# Patient Record
Sex: MALE | Race: BLACK OR AFRICAN AMERICAN | NOT HISPANIC OR LATINO | URBAN - METROPOLITAN AREA
[De-identification: names, ages, dates, MRNs, and addresses within clinical notes are randomized per-mention and may not be internally consistent; named-entity substitution may affect disease eponyms.]

---

## 2020-05-03 ENCOUNTER — EMERGENCY (EMERGENCY)
Facility: HOSPITAL | Age: 45
LOS: 1 days | Discharge: HOME | End: 2020-05-03
Attending: STUDENT IN AN ORGANIZED HEALTH CARE EDUCATION/TRAINING PROGRAM | Admitting: STUDENT IN AN ORGANIZED HEALTH CARE EDUCATION/TRAINING PROGRAM
Payer: COMMERCIAL

## 2020-05-03 VITALS
RESPIRATION RATE: 18 BRPM | OXYGEN SATURATION: 98 % | DIASTOLIC BLOOD PRESSURE: 61 MMHG | HEART RATE: 85 BPM | TEMPERATURE: 101 F | SYSTOLIC BLOOD PRESSURE: 106 MMHG

## 2020-05-03 VITALS
OXYGEN SATURATION: 100 % | DIASTOLIC BLOOD PRESSURE: 76 MMHG | HEART RATE: 88 BPM | RESPIRATION RATE: 16 BRPM | SYSTOLIC BLOOD PRESSURE: 129 MMHG | TEMPERATURE: 101 F

## 2020-05-03 PROCEDURE — 93010 ELECTROCARDIOGRAM REPORT: CPT

## 2020-05-03 PROCEDURE — 99283 EMERGENCY DEPT VISIT LOW MDM: CPT | Mod: 25

## 2020-05-03 PROCEDURE — 71045 X-RAY EXAM CHEST 1 VIEW: CPT | Mod: 26

## 2020-05-03 RX ORDER — ACETAMINOPHEN 500 MG
650 TABLET ORAL ONCE
Refills: 0 | Status: COMPLETED | OUTPATIENT
Start: 2020-05-03 | End: 2020-05-03

## 2020-05-03 RX ADMIN — Medication 650 MILLIGRAM(S): at 22:48

## 2020-05-03 NOTE — ED PROVIDER NOTE - PHYSICAL EXAMINATION
PHYSICAL EXAM:  GENERAL: NAD, well-developed  HEAD:  Atraumatic, Normocephalic  EYES: EOMI, PERRLA, conjunctiva and sclera clear  NECK: Supple, No JVD  CHEST/LUNG: Clear to auscultation bilaterally; No wheeze  HEART: Regular rate and rhythm; No murmurs, rubs, or gallops  ABDOMEN: Soft, Nontender, Nondistended; Bowel sounds present  EXTREMITIES:  2+ Peripheral Pulses, No clubbing, cyanosis, or edema  PSYCH: AAOx3  NEUROLOGY: non-focal  SKIN: No rashes or lesions PHYSICAL EXAM:  GENERAL: NAD, well-developed  HEAD:  Atraumatic, Normocephalic  EYES: EOMI, PERRLA, conjunctiva and sclera clear  NECK: Supple, No JVD  CHEST/LUNG: Clear to auscultation bilaterally; No wheeze  HEART: Regular rate and rhythm; No murmurs, rubs, or gallops  ABDOMEN: Soft, Nontender, Nondistended; Bowel sounds present  EXTREMITIES:  2+ Peripheral Pulses, No clubbing, cyanosis, or edema  PSYCH: AAOx3  NEUROLOGY: non-focal  SKIN: No rashes or lesions  ambulatory O2 sat 98%

## 2020-05-03 NOTE — ED PROVIDER NOTE - NS ED ROS FT
REVIEW OF SYSTEMS:    CONSTITUTIONAL: +fatigue, fevers   EYES/ENT: No visual changes;  + throat pain   NECK: No pain or stiffness  RESPIRATORY: +cough, no wheezing, hemoptysis; + mild shortness of breath  CARDIOVASCULAR: +chest discomfort, no palpitations  GASTROINTESTINAL: No abdominal or epigastric pain. No nausea, vomiting, or hematemesis; +diarrhea. No melena or hematochezia.  GENITOURINARY: No dysuria, frequency or hematuria  NEUROLOGICAL: No numbness or weakness  SKIN: No itching, burning, rashes, or lesions   All other review of systems is negative unless indicated above.

## 2020-05-03 NOTE — ED ADULT TRIAGE NOTE - CHIEF COMPLAINT QUOTE
Pt alert c/o "fevers since Wednesday and cough starting today". Pt tested for covid yesterday pending results. Denies hx or medications. No distress noted.

## 2020-05-03 NOTE — ED PROVIDER NOTE - PATIENT PORTAL LINK FT
You can access the FollowMyHealth Patient Portal offered by [facility] by registering at the following website: [email or web address]. By joining [facility]’s FollowMyHealth portal, you will also be able to view your health information using other applications (apps) compatible with our system.

## 2020-05-03 NOTE — ED PROVIDER NOTE - CLINICAL SUMMARY MEDICAL DECISION MAKING FREE TEXT BOX
44y M with no PMH presenting with fever and cough and consistent with high suspicion for COVID 19 infection. Will obtain ECG and CXR and likely discharge with portable pulse ox and clear instructions for symptom monitoring. 44y M with no PMH presenting with fever and cough with high suspicion for COVID 19 infection. Will obtain ECG and CXR and likely discharge with portable pulse ox and clear instructions for symptom monitoring.

## 2020-05-03 NOTE — ED PROVIDER NOTE - NSFOLLOWUPINSTRUCTIONS_ED_ALL_ED_FT
If you feel worsening shortness of breath or chest pain, lightheadedness please come back to the ED. Please check your oxygen saturation at home and use tylenol every 6 hrs as needed for fevers greater than 100.4F. You were seen for viral symptoms. Your chest Xray was clear and EKG was normal. You were given tylenol for your fever. If you feel worsening shortness of breath or chest pain, lightheadedness please come back to the ED. Please check your oxygen saturation at home and use tylenol every 6 hrs as needed for fevers greater than 100.4F. Please self isolate until your symptoms resolve and follow up your results of your COVID test that you had done in urgent care.

## 2020-05-03 NOTE — ED PROVIDER NOTE - ATTENDING CONTRIBUTION TO CARE
Latosha Howe M.D: 44M no pmh, arrives w/ 5 days fevers/chills (tmax 103), malaise, fatigue, diarrhea (NB), and nonproductive cough. was tested for COVID eysterday, result unknown at this time. has been taking tylenol 1gram every 6 hours but still having persistent fever which is mainly what is worrying him. mom with similar symptoms but she got better. on exam pt is nontoxic appearing, lungs coarse b/l, heart rrr, abd soft ntnd, no LE edema, rest of exam per resident documentation. pt satting 98% on RA and 98% on ambulation. at this point pt with high suspicion fro COVID, but without hypoxia and pt nontoxic appearing no indication for workup at this time. lengthy discussion with pt that fever while uncomfortable is not life threatening and instructed further treamtnet with tylenol and icepacks. strict return precautions discussed and pt to be given pulse ox for dc to monitor oxygen saturation at home. understands need to return if anything worsens or changes or becomes hypoxic <92% at rest, <88% on ambulation. will obtain cxr and ekg here to screen for signs of cardiac iscemia or significant pna.

## 2020-05-03 NOTE — ED ADULT NURSE NOTE - OBJECTIVE STATEMENT
Pt. received to room 10. A&Ox3. ambulatory. C/O of Fevers for the past 5 days unrelieved by Tylenol, Temp 101.2 orally. Pt. also endorses sharp non radiating pain in chest when coughing. Pt. respirations even and unlabored, spo2 100% on RA. ambulatory SPo2 98%. Pt. Placed on cardiac monitor SR, EKG in progress. Pt. +sick contacts at home but unknown if covid positive. Pt. has pending covid test from urgent care. Pt. denies nausea and vomiting. reports diarrhea since Friday. abdomen soft and nontender to palpation. NO Pmh. VSS. MD evaluation in progress. will continue to monitor.

## 2020-05-06 ENCOUNTER — EMERGENCY (EMERGENCY)
Facility: HOSPITAL | Age: 45
LOS: 1 days | Discharge: HOME | End: 2020-05-06
Attending: STUDENT IN AN ORGANIZED HEALTH CARE EDUCATION/TRAINING PROGRAM | Admitting: STUDENT IN AN ORGANIZED HEALTH CARE EDUCATION/TRAINING PROGRAM
Payer: COMMERCIAL

## 2020-05-06 VITALS
TEMPERATURE: 98 F | SYSTOLIC BLOOD PRESSURE: 103 MMHG | OXYGEN SATURATION: 98 % | RESPIRATION RATE: 25 BRPM | HEART RATE: 72 BPM | DIASTOLIC BLOOD PRESSURE: 62 MMHG

## 2020-05-06 VITALS
SYSTOLIC BLOOD PRESSURE: 104 MMHG | TEMPERATURE: 102 F | OXYGEN SATURATION: 97 % | HEART RATE: 93 BPM | RESPIRATION RATE: 22 BRPM | DIASTOLIC BLOOD PRESSURE: 59 MMHG

## 2020-05-06 LAB
ALBUMIN SERPL ELPH-MCNC: 3.9 G/DL — SIGNIFICANT CHANGE UP (ref 3.3–5)
ALP SERPL-CCNC: 52 U/L — SIGNIFICANT CHANGE UP (ref 40–120)
ALT FLD-CCNC: 41 U/L — SIGNIFICANT CHANGE UP (ref 4–41)
ANION GAP SERPL CALC-SCNC: 16 MMO/L — HIGH (ref 7–14)
AST SERPL-CCNC: 69 U/L — HIGH (ref 4–40)
BASE EXCESS BLDV CALC-SCNC: -0.1 MMOL/L — SIGNIFICANT CHANGE UP
BASOPHILS # BLD AUTO: 0.01 K/UL — SIGNIFICANT CHANGE UP (ref 0–0.2)
BASOPHILS NFR BLD AUTO: 0.2 % — SIGNIFICANT CHANGE UP (ref 0–2)
BILIRUB SERPL-MCNC: 0.3 MG/DL — SIGNIFICANT CHANGE UP (ref 0.2–1.2)
BLOOD GAS VENOUS - CREATININE: 1.36 MG/DL — HIGH (ref 0.5–1.3)
BUN SERPL-MCNC: 12 MG/DL — SIGNIFICANT CHANGE UP (ref 7–23)
CALCIUM SERPL-MCNC: 8.8 MG/DL — SIGNIFICANT CHANGE UP (ref 8.4–10.5)
CHLORIDE BLDV-SCNC: 103 MMOL/L — SIGNIFICANT CHANGE UP (ref 96–108)
CHLORIDE SERPL-SCNC: 95 MMOL/L — LOW (ref 98–107)
CO2 SERPL-SCNC: 23 MMOL/L — SIGNIFICANT CHANGE UP (ref 22–31)
CREAT SERPL-MCNC: 1.38 MG/DL — HIGH (ref 0.5–1.3)
D DIMER BLD IA.RAPID-MCNC: 332 NG/ML — SIGNIFICANT CHANGE UP
EOSINOPHIL # BLD AUTO: 0 K/UL — SIGNIFICANT CHANGE UP (ref 0–0.5)
EOSINOPHIL NFR BLD AUTO: 0 % — SIGNIFICANT CHANGE UP (ref 0–6)
GAS PNL BLDV: 132 MMOL/L — LOW (ref 136–146)
GLUCOSE BLDV-MCNC: 111 MG/DL — HIGH (ref 70–99)
GLUCOSE SERPL-MCNC: 113 MG/DL — HIGH (ref 70–99)
HCO3 BLDV-SCNC: 22 MMOL/L — SIGNIFICANT CHANGE UP (ref 20–27)
HCT VFR BLD CALC: 40 % — SIGNIFICANT CHANGE UP (ref 39–50)
HCT VFR BLDV CALC: 43.1 % — SIGNIFICANT CHANGE UP (ref 39–51)
HGB BLD-MCNC: 13.4 G/DL — SIGNIFICANT CHANGE UP (ref 13–17)
HGB BLDV-MCNC: 14 G/DL — SIGNIFICANT CHANGE UP (ref 13–17)
IMM GRANULOCYTES NFR BLD AUTO: 0.2 % — SIGNIFICANT CHANGE UP (ref 0–1.5)
LACTATE BLDV-MCNC: 1.6 MMOL/L — SIGNIFICANT CHANGE UP (ref 0.5–2)
LYMPHOCYTES # BLD AUTO: 0.74 K/UL — LOW (ref 1–3.3)
LYMPHOCYTES # BLD AUTO: 15.4 % — SIGNIFICANT CHANGE UP (ref 13–44)
MAGNESIUM SERPL-MCNC: 2 MG/DL — SIGNIFICANT CHANGE UP (ref 1.6–2.6)
MCHC RBC-ENTMCNC: 28.9 PG — SIGNIFICANT CHANGE UP (ref 27–34)
MCHC RBC-ENTMCNC: 33.5 % — SIGNIFICANT CHANGE UP (ref 32–36)
MCV RBC AUTO: 86.4 FL — SIGNIFICANT CHANGE UP (ref 80–100)
MONOCYTES # BLD AUTO: 0.27 K/UL — SIGNIFICANT CHANGE UP (ref 0–0.9)
MONOCYTES NFR BLD AUTO: 5.6 % — SIGNIFICANT CHANGE UP (ref 2–14)
NEUTROPHILS # BLD AUTO: 3.77 K/UL — SIGNIFICANT CHANGE UP (ref 1.8–7.4)
NEUTROPHILS NFR BLD AUTO: 78.6 % — HIGH (ref 43–77)
NRBC # FLD: 0 K/UL — SIGNIFICANT CHANGE UP (ref 0–0)
PCO2 BLDV: 47 MMHG — SIGNIFICANT CHANGE UP (ref 41–51)
PH BLDV: 7.35 PH — SIGNIFICANT CHANGE UP (ref 7.32–7.43)
PHOSPHATE SERPL-MCNC: 2.8 MG/DL — SIGNIFICANT CHANGE UP (ref 2.5–4.5)
PLATELET # BLD AUTO: 153 K/UL — SIGNIFICANT CHANGE UP (ref 150–400)
PMV BLD: 10.8 FL — SIGNIFICANT CHANGE UP (ref 7–13)
PO2 BLDV: < 24 MMHG — LOW (ref 35–40)
POTASSIUM BLDV-SCNC: 4 MMOL/L — SIGNIFICANT CHANGE UP (ref 3.4–4.5)
POTASSIUM SERPL-MCNC: 4.2 MMOL/L — SIGNIFICANT CHANGE UP (ref 3.5–5.3)
POTASSIUM SERPL-SCNC: 4.2 MMOL/L — SIGNIFICANT CHANGE UP (ref 3.5–5.3)
PROT SERPL-MCNC: 7.9 G/DL — SIGNIFICANT CHANGE UP (ref 6–8.3)
RBC # BLD: 4.63 M/UL — SIGNIFICANT CHANGE UP (ref 4.2–5.8)
RBC # FLD: 13.1 % — SIGNIFICANT CHANGE UP (ref 10.3–14.5)
SAO2 % BLDV: 14.5 % — LOW (ref 60–85)
SODIUM SERPL-SCNC: 134 MMOL/L — LOW (ref 135–145)
WBC # BLD: 4.8 K/UL — SIGNIFICANT CHANGE UP (ref 3.8–10.5)
WBC # FLD AUTO: 4.8 K/UL — SIGNIFICANT CHANGE UP (ref 3.8–10.5)

## 2020-05-06 PROCEDURE — 99285 EMERGENCY DEPT VISIT HI MDM: CPT

## 2020-05-06 PROCEDURE — 71045 X-RAY EXAM CHEST 1 VIEW: CPT | Mod: 26

## 2020-05-06 PROCEDURE — 71275 CT ANGIOGRAPHY CHEST: CPT | Mod: 26

## 2020-05-06 RX ORDER — IBUPROFEN 200 MG
600 TABLET ORAL ONCE
Refills: 0 | Status: COMPLETED | OUTPATIENT
Start: 2020-05-06 | End: 2020-05-06

## 2020-05-06 RX ORDER — ACETAMINOPHEN 500 MG
325 TABLET ORAL ONCE
Refills: 0 | Status: COMPLETED | OUTPATIENT
Start: 2020-05-06 | End: 2020-05-06

## 2020-05-06 RX ADMIN — Medication 325 MILLIGRAM(S): at 22:11

## 2020-05-06 RX ADMIN — Medication 600 MILLIGRAM(S): at 22:11

## 2020-05-06 NOTE — ED PROVIDER NOTE - NSFOLLOWUPCLINICS_GEN_ALL_ED_FT
[facility] Pulmonolgy and Sleep Medicine  Pulmonology  [address] [place] [address], [place] [address]  Phone: [phone]  Fax:   Follow Up Time: Routine

## 2020-05-06 NOTE — ED PROVIDER NOTE - PATIENT PORTAL LINK FT
You can access the FollowMyHealth Patient Portal offered by [facility] by registering at the following website: [email or web address]. By joining [facility]’s FollowMyHealth portal, you will also be able to view your health information using other applications (apps) compatible with our system.

## 2020-05-06 NOTE — ED ADULT TRIAGE NOTE - CHIEF COMPLAINT QUOTE
CVOID + male presents with increased SOB. 95% on RA. Placed on 2L NC for comfort with 02 saturation of 97%. PT reports fever of 104 at home and took tylenol at 7pm.

## 2020-05-06 NOTE — ED PROVIDER NOTE - OBJECTIVE STATEMENT
Hx of COVID diagnosis this past Sunday was discharged home with pulse oximeter, has been running 93-92% at home, been taking care of his mother who was COVID+ but states that he was feeling more short of breath today when a call back RN called him from [facility] and asked how he was, he stated his O2 sat and that he was more short of breath today and subsequently she called an ambulance to bring him here to the ER for further evaluation. States that he feels short of breath here but much better with supplemental oxygen.

## 2020-05-06 NOTE — ED PROVIDER NOTE - PHYSICAL EXAMINATION
Gen: NAD, non-toxic, conversational  Eyes: PERRLA, EOMI   HENT: Normocephalic, atraumatic. External ears normal, no rhinorrhea, moist mucous membranes.   CV: RRR, no M/R/G  Resp: CTAB, non-labored, speaking without difficulty on 2L o2nc  Abd: soft, non tender, non rigid, no guarding or rebound tenderness  Back: No CVAT bilaterally, no midline ttp  Skin: dry, wwp   Neuro: AOx3, speech is fluent and appropriate  Psych: Mood good, affect euthymic

## 2020-05-06 NOTE — ED PROVIDER NOTE - NSFOLLOWUPINSTRUCTIONS_ED_ALL_ED_FT
Shortness of breath    Shortness of breath (dyspnea) means you have trouble breathing and could indicate a medical problem. Causes include lung disease, heart disease, low amount of red blood cells (anemia), poor physical fitness, being overweight, smoking, etc. Your health care provider today may not be able to find a cause for your shortness of breath after your exam. In this case, it is important to have a follow-up exam with your primary care physician as instructed. If medicines were prescribed, take them as directed for the full length of time directed. Refrain from tobacco products.    SEEK IMMEDIATE MEDICAL CARE IF YOU HAVE ANY OF THE FOLLOWING SYMPTOMS: worsening shortness of breath, chest pain, back pain, abdominal pain, fever, coughing up blood, lightheadedness/dizziness.     You were seen and evaluated for possible COVID 19 infection. Testing was NOT performed. We think you are safe for discharge and would like you to follow up in a two to three days with your doctor by phone or in person.   You should treat fevers by taking Tylenol as needed.    You should stay hydrated and increase the amount of fluid you drink.  Return to the Emergency Department if your shortness of breath becomes worse, you become weak, or new symptoms develop.    You should monitor your temperature and quarantine yourself away from others - particularly the elderly, ill, or immunosuppressed - for the next 14 days.    We recommend the below precautionary steps from now until 14 days from when you returned from your travel or date of your last known possible contact:  - Do not go to work, school, or public areas. Avoid using public transportation, ride-sharing, or taxis.  -Wear a mask whenever you are around other people.  -Avoid sharing personal household items. You should not share dishes, drinking glasses, cups, eating utensils, towels, or bedding with other people or pets in your home. After using these items, they should be washed thoroughly with soap and water.   - Avoid touching your eyes, nose, and mouth with your hands.  - Wash your hands often with soap and water for at least 15 to 20 seconds or clean your hands with an alcohol-based hand  that contains 60 to 95% alcohol, covering all surfaces of your hands and rubbing them together until they feel dry. Soap and water should be used preferentially if hands are visibly dirty.

## 2020-05-06 NOTE — ED PROVIDER NOTE - ATTENDING CONTRIBUTION TO CARE
45yo M otherwise healthy, w COVID diagnosis this past Sunday was discharged home with pulse oximeter, has been running 93-92% at home, been taking care of his mother who was COVID+ but states that he was feeling more short of breath today with exertion when a call back RN called him from [facility] and asked how he was, he stated his O2 sat and that he was more short of breath today and subsequently she called an ambulance to bring him here to the ER for further evaluation. States that he feels short of breath here but much better with supplemental oxygen.  continues to have fevers  on exam pt in no acute distress but tachypneic,  100% on RA and 95% amb sat  will check labs, dimer, obs if remains normoxic will dc home

## 2020-05-06 NOTE — ED PROVIDER NOTE - CLINICAL SUMMARY MEDICAL DECISION MAKING FREE TEXT BOX
44M no pmhx presenting for evaluation of SOB, recent diagnosis of COVID 5 days ago now presenting for evaluation after having been called back by a call back RN today for low O2 sat at home in 92-93%, here doing well on supplemental O2, will check basics, cxr, ambulatory sat, follow up studies, reassess, dispo.

## 2020-05-06 NOTE — ED ADULT NURSE NOTE - OBJECTIVE STATEMENT
Pt is a 44yr old male, A&Ox4 and ambulatory, previously tested +COVID, complaining of worsening SOB at rest and on exertion with consistent dry cough. Pt reports using home pulse oximetry, "lowest reading was 93%". Pt currently 100% on RA, lowest ambulatory o2 sat at 95%, MD Payne made aware. Pt breathing even, however appears labored and tachypneic, denies feeling anxious. Orally febrile, pt reports taking 650mg of Tylenol at 7pm. Denies chest pain, headache, dizziness, abd. pain, N/V, and dysuria. VS as noted. NSR on the cardiac monitor. 20g IV placed in the L AC. Labs sent. Meds given as per order. Will continue to monitor.

## 2020-05-07 ENCOUNTER — INPATIENT (INPATIENT)
Facility: HOSPITAL | Age: 45
LOS: 4 days | Discharge: HOME HEALTH | End: 2020-05-12
Attending: INTERNAL MEDICINE | Admitting: INTERNAL MEDICINE
Payer: COMMERCIAL

## 2020-05-07 VITALS
SYSTOLIC BLOOD PRESSURE: 130 MMHG | HEART RATE: 117 BPM | TEMPERATURE: 100 F | DIASTOLIC BLOOD PRESSURE: 79 MMHG | OXYGEN SATURATION: 93 % | RESPIRATION RATE: 28 BRPM

## 2020-05-07 DIAGNOSIS — J18.9 PNEUMONIA, UNSPECIFIED ORGANISM: ICD-10-CM

## 2020-05-07 PROBLEM — Z00.00 ENCOUNTER FOR PREVENTIVE HEALTH EXAMINATION: Status: ACTIVE | Noted: 2020-05-07

## 2020-05-07 LAB
ALBUMIN SERPL ELPH-MCNC: 3.8 G/DL — SIGNIFICANT CHANGE UP (ref 3.3–5)
ALP SERPL-CCNC: 55 U/L — SIGNIFICANT CHANGE UP (ref 40–120)
ALT FLD-CCNC: 43 U/L — HIGH (ref 4–41)
ANION GAP SERPL CALC-SCNC: 17 MMO/L — HIGH (ref 7–14)
AST SERPL-CCNC: 63 U/L — HIGH (ref 4–40)
BASE EXCESS BLDV CALC-SCNC: -2.3 MMOL/L — SIGNIFICANT CHANGE UP
BASOPHILS # BLD AUTO: 0.01 K/UL — SIGNIFICANT CHANGE UP (ref 0–0.2)
BASOPHILS NFR BLD AUTO: 0.1 % — SIGNIFICANT CHANGE UP (ref 0–2)
BILIRUB SERPL-MCNC: 0.4 MG/DL — SIGNIFICANT CHANGE UP (ref 0.2–1.2)
BLOOD GAS VENOUS - CREATININE: 1.4 MG/DL — HIGH (ref 0.5–1.3)
BUN SERPL-MCNC: 13 MG/DL — SIGNIFICANT CHANGE UP (ref 7–23)
CALCIUM SERPL-MCNC: 8.9 MG/DL — SIGNIFICANT CHANGE UP (ref 8.4–10.5)
CHLORIDE BLDV-SCNC: 101 MMOL/L — SIGNIFICANT CHANGE UP (ref 96–108)
CHLORIDE SERPL-SCNC: 96 MMOL/L — LOW (ref 98–107)
CO2 SERPL-SCNC: 21 MMOL/L — LOW (ref 22–31)
CREAT SERPL-MCNC: 1.43 MG/DL — HIGH (ref 0.5–1.3)
CRP SERPL-MCNC: 307.1 MG/L — HIGH
D DIMER BLD IA.RAPID-MCNC: 405 NG/ML — SIGNIFICANT CHANGE UP
EOSINOPHIL # BLD AUTO: 0 K/UL — SIGNIFICANT CHANGE UP (ref 0–0.5)
EOSINOPHIL NFR BLD AUTO: 0 % — SIGNIFICANT CHANGE UP (ref 0–6)
FERRITIN SERPL-MCNC: 1362 NG/ML — HIGH (ref 30–400)
GAS PNL BLDV: 135 MMOL/L — LOW (ref 136–146)
GLUCOSE BLDV-MCNC: 122 MG/DL — HIGH (ref 70–99)
GLUCOSE SERPL-MCNC: 126 MG/DL — HIGH (ref 70–99)
HCO3 BLDV-SCNC: 22 MMOL/L — SIGNIFICANT CHANGE UP (ref 20–27)
HCT VFR BLD CALC: 38.7 % — LOW (ref 39–50)
HCT VFR BLDV CALC: 41.6 % — SIGNIFICANT CHANGE UP (ref 39–51)
HGB BLD-MCNC: 13.4 G/DL — SIGNIFICANT CHANGE UP (ref 13–17)
HGB BLDV-MCNC: 13.5 G/DL — SIGNIFICANT CHANGE UP (ref 13–17)
IMM GRANULOCYTES NFR BLD AUTO: 0.9 % — SIGNIFICANT CHANGE UP (ref 0–1.5)
LACTATE BLDV-MCNC: 2.7 MMOL/L — HIGH (ref 0.5–2)
LYMPHOCYTES # BLD AUTO: 0.54 K/UL — LOW (ref 1–3.3)
LYMPHOCYTES # BLD AUTO: 5.7 % — LOW (ref 13–44)
MCHC RBC-ENTMCNC: 29.7 PG — SIGNIFICANT CHANGE UP (ref 27–34)
MCHC RBC-ENTMCNC: 34.6 % — SIGNIFICANT CHANGE UP (ref 32–36)
MCV RBC AUTO: 85.8 FL — SIGNIFICANT CHANGE UP (ref 80–100)
MONOCYTES # BLD AUTO: 0.34 K/UL — SIGNIFICANT CHANGE UP (ref 0–0.9)
MONOCYTES NFR BLD AUTO: 3.6 % — SIGNIFICANT CHANGE UP (ref 2–14)
NEUTROPHILS # BLD AUTO: 8.44 K/UL — HIGH (ref 1.8–7.4)
NEUTROPHILS NFR BLD AUTO: 89.7 % — HIGH (ref 43–77)
NRBC # FLD: 0 K/UL — SIGNIFICANT CHANGE UP (ref 0–0)
PCO2 BLDV: 41 MMHG — SIGNIFICANT CHANGE UP (ref 41–51)
PH BLDV: 7.36 PH — SIGNIFICANT CHANGE UP (ref 7.32–7.43)
PLATELET # BLD AUTO: 166 K/UL — SIGNIFICANT CHANGE UP (ref 150–400)
PMV BLD: 11.7 FL — SIGNIFICANT CHANGE UP (ref 7–13)
PO2 BLDV: 35 MMHG — SIGNIFICANT CHANGE UP (ref 35–40)
POTASSIUM BLDV-SCNC: 4.3 MMOL/L — SIGNIFICANT CHANGE UP (ref 3.4–4.5)
POTASSIUM SERPL-MCNC: 4.6 MMOL/L — SIGNIFICANT CHANGE UP (ref 3.5–5.3)
POTASSIUM SERPL-SCNC: 4.6 MMOL/L — SIGNIFICANT CHANGE UP (ref 3.5–5.3)
PROCALCITONIN SERPL-MCNC: 0.79 NG/ML — HIGH (ref 0.02–0.1)
PROT SERPL-MCNC: 7.8 G/DL — SIGNIFICANT CHANGE UP (ref 6–8.3)
RBC # BLD: 4.51 M/UL — SIGNIFICANT CHANGE UP (ref 4.2–5.8)
RBC # FLD: 13.2 % — SIGNIFICANT CHANGE UP (ref 10.3–14.5)
SAO2 % BLDV: 58.7 % — LOW (ref 60–85)
SODIUM SERPL-SCNC: 134 MMOL/L — LOW (ref 135–145)
WBC # BLD: 9.41 K/UL — SIGNIFICANT CHANGE UP (ref 3.8–10.5)
WBC # FLD AUTO: 9.41 K/UL — SIGNIFICANT CHANGE UP (ref 3.8–10.5)

## 2020-05-07 PROCEDURE — 99233 SBSQ HOSP IP/OBS HIGH 50: CPT

## 2020-05-07 PROCEDURE — 71045 X-RAY EXAM CHEST 1 VIEW: CPT | Mod: 26

## 2020-05-07 RX ORDER — ENOXAPARIN SODIUM 100 MG/ML
40 INJECTION SUBCUTANEOUS DAILY
Refills: 0 | Status: DISCONTINUED | OUTPATIENT
Start: 2020-05-07 | End: 2020-05-12

## 2020-05-07 RX ORDER — SODIUM CHLORIDE 9 MG/ML
500 INJECTION INTRAMUSCULAR; INTRAVENOUS; SUBCUTANEOUS ONCE
Refills: 0 | Status: COMPLETED | OUTPATIENT
Start: 2020-05-07 | End: 2020-05-07

## 2020-05-07 RX ORDER — IBUPROFEN 200 MG
600 TABLET ORAL ONCE
Refills: 0 | Status: COMPLETED | OUTPATIENT
Start: 2020-05-07 | End: 2020-05-07

## 2020-05-07 RX ORDER — ACETAMINOPHEN 500 MG
650 TABLET ORAL EVERY 6 HOURS
Refills: 0 | Status: DISCONTINUED | OUTPATIENT
Start: 2020-05-07 | End: 2020-05-12

## 2020-05-07 RX ADMIN — SODIUM CHLORIDE 500 MILLILITER(S): 9 INJECTION INTRAMUSCULAR; INTRAVENOUS; SUBCUTANEOUS at 14:49

## 2020-05-07 RX ADMIN — Medication 600 MILLIGRAM(S): at 14:49

## 2020-05-07 RX ADMIN — ENOXAPARIN SODIUM 40 MILLIGRAM(S): 100 INJECTION SUBCUTANEOUS at 21:21

## 2020-05-07 RX ADMIN — Medication 650 MILLIGRAM(S): at 21:21

## 2020-05-07 NOTE — ED PROVIDER NOTE - PROGRESS NOTE DETAILS
AG Pgy3: will a/m for extreme hyperthermia. (105.2 post tylenol) +rigoring. patient started on cooling blanket and ice packs. appears improved. stable for floor.

## 2020-05-07 NOTE — ED PROVIDER NOTE - PHYSICAL EXAMINATION
Attending/Toya: +mild tachypneic, PERRL/EOMI; supple, no JVD; RRR; bibasilar crackles; Abd-soft, NT/ND; no LE edema, +2 DP/PT; A&Ox3; nonfocal; Skin-+hot/dry

## 2020-05-07 NOTE — H&P ADULT - ASSESSMENT
45 y/o man with no PMH who presents with worsening SOB, weakness in the setting of sepsis 2/2 COVID-19 pneumonia.    COVID-19 pneumonia:  - tested positive 3 days ago  - supplemental oxygen to target O2 sat of 92-96%  - Tylenol PRN fever, pain  - isolation  - monitor O2 sat closely - if oxygen requirement worsens, has elevated inflammatory markers and may be good candidate for clinical trial or immunomodulators  - SC Lovenox or DVT ppx    Regular diet

## 2020-05-07 NOTE — ED PROVIDER NOTE - CLINICAL SUMMARY MEDICAL DECISION MAKING FREE TEXT BOX
patient uncomfortable appearing. not hypoxic, but febrile to 105 and tachypneic. negative pe scan yeterday. will get covid labs, repeat cxr, blood cultures. low thresshold to admit.

## 2020-05-07 NOTE — ED ADULT TRIAGE NOTE - CHIEF COMPLAINT QUOTE
Pt c/o SOB, fever/chills x 1 week that worsened. Went to PCP and told to come to ER for worsening SOB and admission. Reports mom at home is recovering from Covid. Appears SOB in triage. + rigors observed.

## 2020-05-07 NOTE — ED PROVIDER NOTE - CONSTITUTIONAL, MLM
normal... tachypneic, truncated sentences. nontoxic appearing. awake, alert, oriented to person, place, time/situation and in no apparent distress.

## 2020-05-07 NOTE — ED PROVIDER NOTE - CARE PLAN
Principal Discharge DX:	Pneumonia Principal Discharge DX:	Pneumonia  Secondary Diagnosis:	Hyperthermia

## 2020-05-07 NOTE — ED PROVIDER NOTE - OBJECTIVE STATEMENT
44 M Tested positive for covid in the past. Fever, cough, sob started last tuesday and has been getting progressively worse. Now feels like he is more weak and having difficulty standing up. Been taking tylenol and albuterol at home (last tylenol at noon today). Called doctor today who told him to come to hospital to admit. This is his third visit to the ER. Had negative CTA for PE yesterday, just "covid pna". denies other drug/etoh use.    Dr. Edouard chacon pmd 44 M Tested positive for covid in the past. Fever, cough, sob started last tuesday and has been getting progressively worse. Now feels like he is more weak and having difficulty standing up. Been taking tylenol and albuterol at home (last tylenol at noon today). Called doctor today who told him to come to hospital to admit. This is his third visit to the ER. Had negative CTA for PE yesterday, just "covid pna". denies other drug/etoh use.    Dr. Edouard chacon pmd    Attending/Toya: 45 yo M as tested above, no sig PMHx p/w ~10 days of nonproductive cough, SOB, fever/chills, He had a positive test this past Monday. Pt seen multiple times in the ED this past week including yesterday in which he had a CT PA negative for PE. Pt has progressively worsening symptoms including SOB and fever/chills.

## 2020-05-07 NOTE — H&P ADULT - HISTORY OF PRESENT ILLNESS
HPI:    44 M no PMH who presents with SOB. He tested positive for covid 3 days ago. Has had fever, cough, SOB started last tuesday and has been getting progressively worse. Has been taking care of his mother who is Covid positive. Now feels like he is more weak and having difficulty standing up. Been taking tylenol and albuterol at home (last tylenol at noon today). Called doctor today who told him to come to hospital to admit. This is his third visit to the ER. Had negative CTA for PE yesterday, read as "covid pna".      PAST MEDICAL & SURGICAL HISTORY:  No pertinent past medical history  No significant past surgical history      Review of Systems:   CONSTITUTIONAL: +fever  EYES: No eye pain, visual disturbances, or discharge  NECK: No pain or stiffness  RESPIRATORY: +SOB  CARDIOVASCULAR: No chest pain, palpitations, dizziness, or leg swelling  GASTROINTESTINAL: No abdominal or epigastric pain. No nausea, vomiting, or hematemesis; No diarrhea or constipation. No melena or hematochezia.  GENITOURINARY: No dysuria, frequency, hematuria, or incontinence  NEUROLOGICAL: +generalized weakness  SKIN: No itching, burning, rashes, or lesions   LYMPH NODES: No enlarged glands  MUSCULOSKELETAL: No joint pain or swelling; No muscle, back, or extremity pain  HEME/LYMPH: No easy bruising, or bleeding gums    Allergies    No Known Allergies    Intolerances        Social History:     FAMILY HISTORY:  Family history of acute myocardial infarction      MEDICATIONS  (STANDING):  enoxaparin Injectable 40 milliGRAM(s) SubCutaneous daily    MEDICATIONS  (PRN):  acetaminophen   Tablet .. 650 milliGRAM(s) Oral every 6 hours PRN Temp greater or equal to 38.5C (101.3F), Mild Pain (1 - 3), Moderate Pain (4 - 6)      T(C): 38 (05-07-20 @ 16:22), Max: 40.7 (05-07-20 @ 14:39)  HR: 80 (05-07-20 @ 16:22) (72 - 117)  BP: 121/65 (05-07-20 @ 16:22) (103/62 - 160/82)  RR: 22 (05-07-20 @ 16:22) (22 - 30)  SpO2: 100% (05-07-20 @ 16:22) (93% - 100%)    CAPILLARY BLOOD GLUCOSE        I&O's Summary      PHYSICAL EXAM:  · Physical Examination: Attending/Toya: +mild tachypneic, PERRL/EOMI; supple, no JVD; RRR; bibasilar crackles; Abd-soft, NT/ND; no LE edema, +2 DP/PT; A&Ox3; nonfocal; Skin-+hot/dry  · CONSTITUTIONAL: tachypneic, truncated sentences. nontoxic appearing. awake, alert, oriented to person, place, time/situation and in no apparent distress.  · ENMT: Airway patent, Nasal mucosa clear. Mouth with normal mucosa. Throat has no vesicles, no oropharyngeal exudates and uvula is midline.  · CARDIAC: Normal rate, regular rhythm.  Heart sounds S1, S2.  No murmurs, rubs or gallops. 2+ pulses in distal extremities b/l  · RESPIRATORY: tachypneic. bbibasilar crackles. satting 98-99% on RA  · GASTROINTESTINAL: Abdomen soft, non-tender, no guarding.  · MUSCULOSKELETAL: Spine appears normal, range of motion is not limited, no muscle or joint tenderness 5/5 strength in distal extremities b/l  · NEUROLOGICAL: Alert and oriented, no focal deficits, no motor or sensory deficits. cn2-12 grossly intact, normal speech and tone.  · SKIN: Skin normal color for race, warm, dry and intact. No evidence of rash.        LABS:                        13.4   9.41  )-----------( 166      ( 07 May 2020 14:33 )             38.7     05-07    134<L>  |  96<L>  |  13  ----------------------------<  126<H>  4.6   |  21<L>  |  1.43<H>    Ca    8.9      07 May 2020 14:22  Phos  2.8     05-06  Mg     2.0     05-06    TPro  7.8  /  Alb  3.8  /  TBili  0.4  /  DBili  x   /  AST  63<H>  /  ALT  43<H>  /  AlkPhos  55  05-07              RADIOLOGY & ADDITIONAL TESTS:    EKG:    CXR:    Other radiology    < from: CT Angio Chest w/ IV Cont (05.06.20 @ 23:23) >  IMPRESSION:   COVID pneumonia.  No pulmonary embolism.    < end of copied text >        Care Discussed with Consultants/Other Providers:        Monica Scott M.D.  Hospitalist  Pager [phone]

## 2020-05-07 NOTE — ED ADULT NURSE NOTE - OBJECTIVE STATEMENT
Pt presents to the ED with worsening shortness of breath, fevers from home. pt AxOx3, ambulatory. Pt states he had a positive covid test on Tuesday and was seen last night in the ED. pt states he had xrays, ct scan done yesterday and was sent home. Pt denies chest pain, lightheadedness and dizziness. Pt breathing even but labored and tachypneic. pt O2 sat 100% on room air. pt placed on 3L NC for comfort. pt states having the oxygen helps him breathe better. Pt denies abd pain, n/v/d. pt denies dysuria and hematuria. Pt rectal temp 105.2, pt placed on cooling blanket and ice packs placed on patient. meds given as per orders. Skin clean dry and intact. 20g IVL placed in the L arm. Will continue to monitor.

## 2020-05-08 ENCOUNTER — APPOINTMENT (OUTPATIENT)
Dept: PULMONOLOGY | Facility: CLINIC | Age: 45
End: 2020-05-08

## 2020-05-08 LAB
ANION GAP SERPL CALC-SCNC: 15 MMO/L — HIGH (ref 7–14)
BASOPHILS # BLD AUTO: 0.01 K/UL — SIGNIFICANT CHANGE UP (ref 0–0.2)
BASOPHILS NFR BLD AUTO: 0.1 % — SIGNIFICANT CHANGE UP (ref 0–2)
BUN SERPL-MCNC: 19 MG/DL — SIGNIFICANT CHANGE UP (ref 7–23)
CALCIUM SERPL-MCNC: 8.7 MG/DL — SIGNIFICANT CHANGE UP (ref 8.4–10.5)
CHLORIDE SERPL-SCNC: 99 MMOL/L — SIGNIFICANT CHANGE UP (ref 98–107)
CO2 SERPL-SCNC: 22 MMOL/L — SIGNIFICANT CHANGE UP (ref 22–31)
CREAT SERPL-MCNC: 1.68 MG/DL — HIGH (ref 0.5–1.3)
EOSINOPHIL # BLD AUTO: 0 K/UL — SIGNIFICANT CHANGE UP (ref 0–0.5)
EOSINOPHIL NFR BLD AUTO: 0 % — SIGNIFICANT CHANGE UP (ref 0–6)
GLUCOSE SERPL-MCNC: 102 MG/DL — HIGH (ref 70–99)
HCT VFR BLD CALC: 38.6 % — LOW (ref 39–50)
HGB BLD-MCNC: 13 G/DL — SIGNIFICANT CHANGE UP (ref 13–17)
IMM GRANULOCYTES NFR BLD AUTO: 1 % — SIGNIFICANT CHANGE UP (ref 0–1.5)
LYMPHOCYTES # BLD AUTO: 0.82 K/UL — LOW (ref 1–3.3)
LYMPHOCYTES # BLD AUTO: 9.2 % — LOW (ref 13–44)
MCHC RBC-ENTMCNC: 28.9 PG — SIGNIFICANT CHANGE UP (ref 27–34)
MCHC RBC-ENTMCNC: 33.7 % — SIGNIFICANT CHANGE UP (ref 32–36)
MCV RBC AUTO: 85.8 FL — SIGNIFICANT CHANGE UP (ref 80–100)
MONOCYTES # BLD AUTO: 0.27 K/UL — SIGNIFICANT CHANGE UP (ref 0–0.9)
MONOCYTES NFR BLD AUTO: 3 % — SIGNIFICANT CHANGE UP (ref 2–14)
NEUTROPHILS # BLD AUTO: 7.68 K/UL — HIGH (ref 1.8–7.4)
NEUTROPHILS NFR BLD AUTO: 86.7 % — HIGH (ref 43–77)
NRBC # FLD: 0 K/UL — SIGNIFICANT CHANGE UP (ref 0–0)
PLATELET # BLD AUTO: 179 K/UL — SIGNIFICANT CHANGE UP (ref 150–400)
PMV BLD: 10.6 FL — SIGNIFICANT CHANGE UP (ref 7–13)
POTASSIUM SERPL-MCNC: 4.3 MMOL/L — SIGNIFICANT CHANGE UP (ref 3.5–5.3)
POTASSIUM SERPL-SCNC: 4.3 MMOL/L — SIGNIFICANT CHANGE UP (ref 3.5–5.3)
RBC # BLD: 4.5 M/UL — SIGNIFICANT CHANGE UP (ref 4.2–5.8)
RBC # FLD: 13.4 % — SIGNIFICANT CHANGE UP (ref 10.3–14.5)
SARS-COV-2 RNA SPEC QL NAA+PROBE: DETECTED
SODIUM SERPL-SCNC: 136 MMOL/L — SIGNIFICANT CHANGE UP (ref 135–145)
WBC # BLD: 8.87 K/UL — SIGNIFICANT CHANGE UP (ref 3.8–10.5)
WBC # FLD AUTO: 8.87 K/UL — SIGNIFICANT CHANGE UP (ref 3.8–10.5)

## 2020-05-08 PROCEDURE — 99233 SBSQ HOSP IP/OBS HIGH 50: CPT

## 2020-05-08 RX ORDER — ACETAMINOPHEN 500 MG
650 TABLET ORAL ONCE
Refills: 0 | Status: COMPLETED | OUTPATIENT
Start: 2020-05-08 | End: 2020-05-08

## 2020-05-08 RX ORDER — SODIUM CHLORIDE 9 MG/ML
1000 INJECTION, SOLUTION INTRAVENOUS
Refills: 0 | Status: DISCONTINUED | OUTPATIENT
Start: 2020-05-08 | End: 2020-05-09

## 2020-05-08 RX ORDER — ALBUTEROL 90 UG/1
2 AEROSOL, METERED ORAL EVERY 6 HOURS
Refills: 0 | Status: DISCONTINUED | OUTPATIENT
Start: 2020-05-08 | End: 2020-05-12

## 2020-05-08 RX ADMIN — SODIUM CHLORIDE 125 MILLILITER(S): 9 INJECTION, SOLUTION INTRAVENOUS at 12:04

## 2020-05-08 RX ADMIN — Medication 260 MILLIGRAM(S): at 01:53

## 2020-05-08 RX ADMIN — ENOXAPARIN SODIUM 40 MILLIGRAM(S): 100 INJECTION SUBCUTANEOUS at 12:21

## 2020-05-08 NOTE — PROGRESS NOTE ADULT - SUBJECTIVE AND OBJECTIVE BOX
[facility] Division of Hospital Medicine  Lidya Felder DO  Pager: [phone]  Other Times:  [phone]    Patient is a 44y old  Male who presents with a chief complaint of SOB (07 May 2020 17:17)    SUBJECTIVE / OVERNIGHT EVENTS:  Patient seen complaining of dyspnea, fatigue, feels better now that fever broke.    ADDITIONAL REVIEW OF SYSTEMS:    MEDICATIONS  (STANDING):  enoxaparin Injectable 40 milliGRAM(s) SubCutaneous daily  lactated ringers. 1000 milliLiter(s) (125 mL/Hr) IV Continuous <Continuous>    MEDICATIONS  (PRN):  acetaminophen   Tablet .. 650 milliGRAM(s) Oral every 6 hours PRN Temp greater or equal to 38.5C (101.3F), Mild Pain (1 - 3), Moderate Pain (4 - 6)      CAPILLARY BLOOD GLUCOSE        I&O's Summary    07 May 2020 07:01  -  08 May 2020 07:00  --------------------------------------------------------  IN: 400 mL / OUT: 700 mL / NET: -300 mL    08 May 2020 07:01  -  08 May 2020 11:58  --------------------------------------------------------  IN: 220 mL / OUT: 500 mL / NET: -280 mL        PHYSICAL EXAM:  Vital Signs Last 24 Hrs  T(C): 37.1 (08 May 2020 04:15), Max: 40.7 (07 May 2020 14:39)  T(F): 98.8 (08 May 2020 04:15), Max: 105.2 (07 May 2020 14:39)  HR: 83 (08 May 2020 04:15) (80 - 117)  BP: 110/69 (08 May 2020 04:15) (110/69 - 160/82)  BP(mean): --  RR: 20 (08 May 2020 04:15) (20 - 30)  SpO2: 100% (08 May 2020 04:15) (93% - 100%)  CONSTITUTIONAL: NAD, cooperative with exam  EYES: PERRLA; conjunctiva and sclera clear  ENMT: Moist oral mucosa, no pharyngeal injection or exudates; normal dentition  NECK: Supple, no palpable masses; no thyromegaly  RESPIRATORY: Speaking in full sentences, tachypneic, no accessory m. use   MUSCULOSKELETAL:  Normal gait; no clubbing or cyanosis of digits; no joint swelling or tenderness to palpation  PSYCH: A+O to person, place, and time; affect appropriate  NEUROLOGY: CN 2-12 are intact and symmetric; no gross sensory deficits;   SKIN: No rashes; no palpable lesions    LABS:                        13.0   8.87  )-----------( 179      ( 08 May 2020 06:00 )             38.6     05-08    136  |  99  |  19  ----------------------------<  102<H>  4.3   |  22  |  1.68<H>    Ca    8.7      08 May 2020 06:00  Phos  2.8     05-06  Mg     2.0     05-06    TPro  7.8  /  Alb  3.8  /  TBili  0.4  /  DBili  x   /  AST  63<H>  /  ALT  43<H>  /  AlkPhos  55  05-07                RADIOLOGY & ADDITIONAL TESTS:  Results Reviewed:   Imaging Personally Reviewed:  Electrocardiogram Personally Reviewed:    COORDINATION OF CARE:  Care Discussed with Consultants/Other Providers [Y/N]:  Prior or Outpatient Records Reviewed [Y/N]: [facility] Division of Hospital Medicine  Lidya Felder DO  Pager: [phone]  Other Times:  [phone]    Patient is a 44y old  Male who presents with a chief complaint of SOB (07 May 2020 17:17)    SUBJECTIVE / OVERNIGHT EVENTS:  Patient seen complaining of dyspnea, fatigue, feels better now that fever broke.  Able to drink, but difficulty eating.    ADDITIONAL REVIEW OF SYSTEMS:    MEDICATIONS  (STANDING):  enoxaparin Injectable 40 milliGRAM(s) SubCutaneous daily  lactated ringers. 1000 milliLiter(s) (125 mL/Hr) IV Continuous <Continuous>    MEDICATIONS  (PRN):  acetaminophen   Tablet .. 650 milliGRAM(s) Oral every 6 hours PRN Temp greater or equal to 38.5C (101.3F), Mild Pain (1 - 3), Moderate Pain (4 - 6)      CAPILLARY BLOOD GLUCOSE        I&O's Summary    07 May 2020 07:01  -  08 May 2020 07:00  --------------------------------------------------------  IN: 400 mL / OUT: 700 mL / NET: -300 mL    08 May 2020 07:01  -  08 May 2020 11:58  --------------------------------------------------------  IN: 220 mL / OUT: 500 mL / NET: -280 mL        PHYSICAL EXAM:  Vital Signs Last 24 Hrs  T(C): 37.1 (08 May 2020 04:15), Max: 40.7 (07 May 2020 14:39)  T(F): 98.8 (08 May 2020 04:15), Max: 105.2 (07 May 2020 14:39)  HR: 83 (08 May 2020 04:15) (80 - 117)  BP: 110/69 (08 May 2020 04:15) (110/69 - 160/82)  BP(mean): --  RR: 20 (08 May 2020 04:15) (20 - 30)  SpO2: 100% (08 May 2020 04:15) (93% - 100%)  CONSTITUTIONAL: NAD, cooperative with exam  EYES: PERRLA; conjunctiva and sclera clear  ENMT: Moist oral mucosa, no pharyngeal injection or exudates; normal dentition  NECK: Supple, no palpable masses; no thyromegaly  RESPIRATORY: Speaking in full sentences, tachypneic, no accessory m. use   MUSCULOSKELETAL:  Normal gait; no clubbing or cyanosis of digits; no joint swelling or tenderness to palpation  PSYCH: A+O to person, place, and time; affect appropriate  NEUROLOGY: CN 2-12 are intact and symmetric; no gross sensory deficits;   SKIN: No rashes; no palpable lesions    LABS:                        13.0   8.87  )-----------( 179      ( 08 May 2020 06:00 )             38.6     05-08    136  |  99  |  19  ----------------------------<  102<H>  4.3   |  22  |  1.68<H>    Ca    8.7      08 May 2020 06:00  Phos  2.8     05-06  Mg     2.0     05-06    TPro  7.8  /  Alb  3.8  /  TBili  0.4  /  DBili  x   /  AST  63<H>  /  ALT  43<H>  /  AlkPhos  55  05-07                RADIOLOGY & ADDITIONAL TESTS:  Results Reviewed:   Imaging Personally Reviewed:  Electrocardiogram Personally Reviewed:    COORDINATION OF CARE:  Care Discussed with Consultants/Other Providers [Y/N]:  Prior or Outpatient Records Reviewed [Y/N]:

## 2020-05-08 NOTE — PROGRESS NOTE ADULT - ASSESSMENT
43 y/o man with no PMH who presents with worsening SOB, weakness in the setting of sepsis 2/2 COVID-19 pneumonia.    COVID-19 pneumonia:  - tested positive 3 days ago  - famotidine/HCQ trial on hold, awaiting to hear if eligible for plasma trial, clinically stable  - Tylenol PRN fever, pain  - encourage incentive spirometer albuterol   - isolation  - monitor O2 sat closely, supplemental oxygen to target O2 sat of 92-96%  - SC Lovenox or DVT ppx, not currently eligible for AC trial, will obtain AM d-dimer/INR/fibrinogen    Regular diet 43 y/o man with no PMH who presents with worsening SOB, weakness in the setting of sepsis 2/2 COVID-19 pneumonia.    #COVID-19 pneumonia:  - tested positive 3 days ago  - famotidine/HCQ trial on hold, awaiting to hear if eligible for plasma trial, clinically stable  - Tylenol PRN fever, pain  - encourage incentive spirometer albuterol   - isolation  - monitor O2 sat closely, supplemental oxygen to target O2 sat of 92-96%  - SC Lovenox or DVT ppx, not currently eligible for AC trial, will obtain AM d-dimer/INR/fibrinogen    #CORY:  Cr elevation, 1.43->1.68, likely 2/2 decreased po intake  - IV LR @ 125 cc/hr  - encourage po intake  - monitor BMP, urine output    Regular diet

## 2020-05-09 LAB
ALBUMIN SERPL ELPH-MCNC: 3.3 G/DL — SIGNIFICANT CHANGE UP (ref 3.3–5)
ALP SERPL-CCNC: 67 U/L — SIGNIFICANT CHANGE UP (ref 40–120)
ALT FLD-CCNC: 54 U/L — HIGH (ref 4–41)
ANION GAP SERPL CALC-SCNC: 17 MMO/L — HIGH (ref 7–14)
APTT BLD: 31 SEC — SIGNIFICANT CHANGE UP (ref 27.5–36.3)
AST SERPL-CCNC: 90 U/L — HIGH (ref 4–40)
BASOPHILS # BLD AUTO: 0.02 K/UL — SIGNIFICANT CHANGE UP (ref 0–0.2)
BASOPHILS NFR BLD AUTO: 0.2 % — SIGNIFICANT CHANGE UP (ref 0–2)
BILIRUB SERPL-MCNC: 0.4 MG/DL — SIGNIFICANT CHANGE UP (ref 0.2–1.2)
BUN SERPL-MCNC: 17 MG/DL — SIGNIFICANT CHANGE UP (ref 7–23)
CALCIUM SERPL-MCNC: 8.4 MG/DL — SIGNIFICANT CHANGE UP (ref 8.4–10.5)
CHLORIDE SERPL-SCNC: 108 MMOL/L — HIGH (ref 98–107)
CO2 SERPL-SCNC: 21 MMOL/L — LOW (ref 22–31)
CREAT SERPL-MCNC: 1.26 MG/DL — SIGNIFICANT CHANGE UP (ref 0.5–1.3)
CRP SERPL-MCNC: 310.9 MG/L — HIGH
D DIMER BLD IA.RAPID-MCNC: 677 NG/ML — SIGNIFICANT CHANGE UP
EOSINOPHIL # BLD AUTO: 0 K/UL — SIGNIFICANT CHANGE UP (ref 0–0.5)
EOSINOPHIL NFR BLD AUTO: 0 % — SIGNIFICANT CHANGE UP (ref 0–6)
FERRITIN SERPL-MCNC: 1740 NG/ML — HIGH (ref 30–400)
FIBRINOGEN PPP-MCNC: 1010 MG/DL — HIGH (ref 300–520)
GLUCOSE SERPL-MCNC: 108 MG/DL — HIGH (ref 70–99)
HCT VFR BLD CALC: 36.5 % — LOW (ref 39–50)
HGB BLD-MCNC: 12.5 G/DL — LOW (ref 13–17)
IMM GRANULOCYTES NFR BLD AUTO: 0.5 % — SIGNIFICANT CHANGE UP (ref 0–1.5)
INR BLD: 1.35 — HIGH (ref 0.88–1.17)
LDH SERPL L TO P-CCNC: 519 U/L — HIGH (ref 135–225)
LYMPHOCYTES # BLD AUTO: 0.87 K/UL — LOW (ref 1–3.3)
LYMPHOCYTES # BLD AUTO: 10.6 % — LOW (ref 13–44)
MAGNESIUM SERPL-MCNC: 2.7 MG/DL — HIGH (ref 1.6–2.6)
MCHC RBC-ENTMCNC: 29.2 PG — SIGNIFICANT CHANGE UP (ref 27–34)
MCHC RBC-ENTMCNC: 34.2 % — SIGNIFICANT CHANGE UP (ref 32–36)
MCV RBC AUTO: 85.3 FL — SIGNIFICANT CHANGE UP (ref 80–100)
MONOCYTES # BLD AUTO: 0.41 K/UL — SIGNIFICANT CHANGE UP (ref 0–0.9)
MONOCYTES NFR BLD AUTO: 5 % — SIGNIFICANT CHANGE UP (ref 2–14)
NEUTROPHILS # BLD AUTO: 6.88 K/UL — SIGNIFICANT CHANGE UP (ref 1.8–7.4)
NEUTROPHILS NFR BLD AUTO: 83.7 % — HIGH (ref 43–77)
NRBC # FLD: 0 K/UL — SIGNIFICANT CHANGE UP (ref 0–0)
PHOSPHATE SERPL-MCNC: 2.7 MG/DL — SIGNIFICANT CHANGE UP (ref 2.5–4.5)
PLATELET # BLD AUTO: 240 K/UL — SIGNIFICANT CHANGE UP (ref 150–400)
PMV BLD: 10.9 FL — SIGNIFICANT CHANGE UP (ref 7–13)
POTASSIUM SERPL-MCNC: 4.6 MMOL/L — SIGNIFICANT CHANGE UP (ref 3.5–5.3)
POTASSIUM SERPL-SCNC: 4.6 MMOL/L — SIGNIFICANT CHANGE UP (ref 3.5–5.3)
PROCALCITONIN SERPL-MCNC: 4.42 NG/ML — HIGH (ref 0.02–0.1)
PROT SERPL-MCNC: 7.7 G/DL — SIGNIFICANT CHANGE UP (ref 6–8.3)
PROTHROM AB SERPL-ACNC: 15.7 SEC — HIGH (ref 9.8–13.1)
RBC # BLD: 4.28 M/UL — SIGNIFICANT CHANGE UP (ref 4.2–5.8)
RBC # FLD: 13.4 % — SIGNIFICANT CHANGE UP (ref 10.3–14.5)
SODIUM SERPL-SCNC: 146 MMOL/L — HIGH (ref 135–145)
WBC # BLD: 8.22 K/UL — SIGNIFICANT CHANGE UP (ref 3.8–10.5)
WBC # FLD AUTO: 8.22 K/UL — SIGNIFICANT CHANGE UP (ref 3.8–10.5)

## 2020-05-09 PROCEDURE — 99232 SBSQ HOSP IP/OBS MODERATE 35: CPT

## 2020-05-09 RX ORDER — CHOLECALCIFEROL (VITAMIN D3) 125 MCG
1000 CAPSULE ORAL DAILY
Refills: 0 | Status: DISCONTINUED | OUTPATIENT
Start: 2020-05-09 | End: 2020-05-12

## 2020-05-09 RX ORDER — ASCORBIC ACID 60 MG
500 TABLET,CHEWABLE ORAL DAILY
Refills: 0 | Status: DISCONTINUED | OUTPATIENT
Start: 2020-05-09 | End: 2020-05-12

## 2020-05-09 RX ADMIN — Medication 1000 UNIT(S): at 13:34

## 2020-05-09 RX ADMIN — ENOXAPARIN SODIUM 40 MILLIGRAM(S): 100 INJECTION SUBCUTANEOUS at 13:34

## 2020-05-09 RX ADMIN — Medication 500 MILLIGRAM(S): at 13:34

## 2020-05-09 NOTE — PROGRESS NOTE ADULT - ASSESSMENT
45 y/o man with no PMH who presents with worsening SOB, weakness in the setting of sepsis 2/2 COVID-19 pneumonia.    #Sepsis 2/2 COVID-19 pneumonia:  - Inflammatory markers trending up, Tmax 101.7, clinically stable  - patient declined enrollment in plasma trial, will continue to monitor, patient considering remdesivir, will speak with family  - Tylenol PRN fever, pain  - encourage incentive spirometer albuterol   - isolation  - monitor O2 sat closely, supplemental oxygen to target O2 sat of 92-96%  - SC Lovenox or DVT ppx, not currently eligible for AC trial, will obtain AM d-dimer/INR/fibrinogen    #CORY:  Improving, likely 2/2 decreased po intake  - encourage po intake  - monitor BMP, urine output    #Hypernatremia:  Likely free water deficit  - encourage free water  - monitor BMP    #Transaminitis:  Trending up, likely reactive in setting of sepsis  - continue to trend  - hep panel in AM  - will consider abdominal US if worsens     Regular diet

## 2020-05-09 NOTE — PROGRESS NOTE ADULT - SUBJECTIVE AND OBJECTIVE BOX
[facility] Division of Hospital Medicine  Lidya Felder DO  Pager: [phone]  Other Times:  [phone]    Patient is a 44y old  Male who presents with a chief complaint of SOB (08 May 2020 11:58)    SUBJECTIVE / OVERNIGHT EVENTS:  Patient seen still dyspneic, difficulty getting out of bed from SOB. Declined plasma trial, will consider remdesivir after speaking with family and doing more research on drug.     ADDITIONAL REVIEW OF SYSTEMS:    MEDICATIONS  (STANDING):  ascorbic acid 500 milliGRAM(s) Oral daily  cholecalciferol 1000 Unit(s) Oral daily  enoxaparin Injectable 40 milliGRAM(s) SubCutaneous daily    MEDICATIONS  (PRN):  acetaminophen   Tablet .. 650 milliGRAM(s) Oral every 6 hours PRN Temp greater or equal to 38.5C (101.3F), Mild Pain (1 - 3), Moderate Pain (4 - 6)  ALBUTerol    90 MICROgram(s) HFA Inhaler 2 Puff(s) Inhalation every 6 hours PRN Shortness of Breath      CAPILLARY BLOOD GLUCOSE        I&O's Summary    08 May 2020 07:01  -  09 May 2020 07:00  --------------------------------------------------------  IN: 1330 mL / OUT: 2625 mL / NET: -1295 mL    09 May 2020 07:01  -  09 May 2020 11:30  --------------------------------------------------------  IN: 300 mL / OUT: 350 mL / NET: -50 mL        PHYSICAL EXAM:  Vital Signs Last 24 Hrs  T(C): 37.3 (08 May 2020 20:37), Max: 37.3 (08 May 2020 20:37)  T(F): 99.1 (08 May 2020 20:37), Max: 99.1 (08 May 2020 20:37)  HR: 77 (08 May 2020 20:37) (77 - 77)  BP: 118/64 (08 May 2020 20:37) (118/64 - 118/64)  BP(mean): --  RR: 20 (08 May 2020 20:37) (20 - 20)  SpO2: 98% (08 May 2020 20:37) (98% - 98%)  CONSTITUTIONAL: NAD, appears lethargic, cooperative with exam  EYES: PERRLA; conjunctiva and sclera clear  ENMT: Moist oral mucosa, no pharyngeal injection or exudates; normal dentition  NECK: Supple, no palpable masses; no thyromegaly  RESPIRATORY: Speaking in full sentences, no accessory m. use, tachypneic   PSYCH: A+O to person, place, and time; affect appropriate  NEUROLOGY: CN 2-12 are intact and symmetric; no gross sensory deficits;   SKIN: No rashes; no palpable lesions    LABS:                        12.5   8.22  )-----------( 240      ( 09 May 2020 04:53 )             36.5     05-09    146<H>  |  108<H>  |  17  ----------------------------<  108<H>  4.6   |  21<L>  |  1.26    Ca    8.4      09 May 2020 04:53  Phos  2.7     05-09  Mg     2.7     05-09    TPro  7.7  /  Alb  3.3  /  TBili  0.4  /  DBili  x   /  AST  90<H>  /  ALT  54<H>  /  AlkPhos  67  05-09    PT/INR - ( 09 May 2020 04:53 )   PT: 15.7 SEC;   INR: 1.35          PTT - ( 09 May 2020 04:53 )  PTT:31.0 SEC          Culture - Blood (collected 07 May 2020 18:05)  Source: .Blood Blood-Venous  Preliminary Report (08 May 2020 19:01):    No growth to date.    Culture - Blood (collected 07 May 2020 18:05)  Source: .Blood Blood-Venous  Preliminary Report (08 May 2020 19:01):    No growth to date.        RADIOLOGY & ADDITIONAL TESTS:  Results Reviewed:   Imaging Personally Reviewed:  Electrocardiogram Personally Reviewed:    COORDINATION OF CARE:  Care Discussed with Consultants/Other Providers [Y/N]:  Prior or Outpatient Records Reviewed [Y/N]:

## 2020-05-10 ENCOUNTER — TRANSCRIPTION ENCOUNTER (OUTPATIENT)
Age: 45
End: 2020-05-10

## 2020-05-10 LAB
ALBUMIN SERPL ELPH-MCNC: 3.3 G/DL — SIGNIFICANT CHANGE UP (ref 3.3–5)
ALBUMIN SERPL ELPH-MCNC: 3.3 G/DL — SIGNIFICANT CHANGE UP (ref 3.3–5)
ALP SERPL-CCNC: 67 U/L — SIGNIFICANT CHANGE UP (ref 40–120)
ALP SERPL-CCNC: 67 U/L — SIGNIFICANT CHANGE UP (ref 40–120)
ALT FLD-CCNC: 120 U/L — HIGH (ref 4–41)
ALT FLD-CCNC: 120 U/L — HIGH (ref 4–41)
ANION GAP SERPL CALC-SCNC: 15 MMO/L — HIGH (ref 7–14)
ANION GAP SERPL CALC-SCNC: 15 MMO/L — HIGH (ref 7–14)
AST SERPL-CCNC: 148 U/L — HIGH (ref 4–40)
AST SERPL-CCNC: 148 U/L — HIGH (ref 4–40)
BILIRUB SERPL-MCNC: 0.5 MG/DL — SIGNIFICANT CHANGE UP (ref 0.2–1.2)
BILIRUB SERPL-MCNC: 0.5 MG/DL — SIGNIFICANT CHANGE UP (ref 0.2–1.2)
BUN SERPL-MCNC: 15 MG/DL — SIGNIFICANT CHANGE UP (ref 7–23)
BUN SERPL-MCNC: 15 MG/DL — SIGNIFICANT CHANGE UP (ref 7–23)
CALCIUM SERPL-MCNC: 9 MG/DL — SIGNIFICANT CHANGE UP (ref 8.4–10.5)
CALCIUM SERPL-MCNC: 9 MG/DL — SIGNIFICANT CHANGE UP (ref 8.4–10.5)
CHLORIDE SERPL-SCNC: 104 MMOL/L — SIGNIFICANT CHANGE UP (ref 98–107)
CHLORIDE SERPL-SCNC: 104 MMOL/L — SIGNIFICANT CHANGE UP (ref 98–107)
CO2 SERPL-SCNC: 21 MMOL/L — LOW (ref 22–31)
CO2 SERPL-SCNC: 21 MMOL/L — LOW (ref 22–31)
CREAT SERPL-MCNC: 0.99 MG/DL — SIGNIFICANT CHANGE UP (ref 0.5–1.3)
CREAT SERPL-MCNC: 0.99 MG/DL — SIGNIFICANT CHANGE UP (ref 0.5–1.3)
D DIMER BLD IA.RAPID-MCNC: 841 NG/ML — SIGNIFICANT CHANGE UP
FIBRINOGEN PPP-MCNC: 1027 MG/DL — HIGH (ref 300–520)
GLUCOSE SERPL-MCNC: 110 MG/DL — HIGH (ref 70–99)
GLUCOSE SERPL-MCNC: 110 MG/DL — HIGH (ref 70–99)
HAV IGM SER-ACNC: NONREACTIVE — SIGNIFICANT CHANGE UP
HBV CORE IGM SER-ACNC: NONREACTIVE — SIGNIFICANT CHANGE UP
HBV SURFACE AG SER-ACNC: NONREACTIVE — SIGNIFICANT CHANGE UP
HCV AB S/CO SERPL IA: 0.34 S/CO — SIGNIFICANT CHANGE UP (ref 0–0.99)
HCV AB SERPL-IMP: SIGNIFICANT CHANGE UP
INR BLD: 1.31 — HIGH (ref 0.88–1.17)
MAGNESIUM SERPL-MCNC: 2.3 MG/DL — SIGNIFICANT CHANGE UP (ref 1.6–2.6)
PHOSPHATE SERPL-MCNC: 3 MG/DL — SIGNIFICANT CHANGE UP (ref 2.5–4.5)
POTASSIUM SERPL-MCNC: 4 MMOL/L — SIGNIFICANT CHANGE UP (ref 3.5–5.3)
POTASSIUM SERPL-MCNC: 4 MMOL/L — SIGNIFICANT CHANGE UP (ref 3.5–5.3)
POTASSIUM SERPL-SCNC: 4 MMOL/L — SIGNIFICANT CHANGE UP (ref 3.5–5.3)
POTASSIUM SERPL-SCNC: 4 MMOL/L — SIGNIFICANT CHANGE UP (ref 3.5–5.3)
PROCALCITONIN SERPL-MCNC: 2.15 NG/ML — HIGH (ref 0.02–0.1)
PROT SERPL-MCNC: 7.3 G/DL — SIGNIFICANT CHANGE UP (ref 6–8.3)
PROT SERPL-MCNC: 7.3 G/DL — SIGNIFICANT CHANGE UP (ref 6–8.3)
PROTHROM AB SERPL-ACNC: 15.1 SEC — HIGH (ref 9.8–13.1)
SODIUM SERPL-SCNC: 140 MMOL/L — SIGNIFICANT CHANGE UP (ref 135–145)
SODIUM SERPL-SCNC: 140 MMOL/L — SIGNIFICANT CHANGE UP (ref 135–145)

## 2020-05-10 PROCEDURE — 99232 SBSQ HOSP IP/OBS MODERATE 35: CPT

## 2020-05-10 PROCEDURE — 76705 ECHO EXAM OF ABDOMEN: CPT | Mod: 26

## 2020-05-10 RX ADMIN — ENOXAPARIN SODIUM 40 MILLIGRAM(S): 100 INJECTION SUBCUTANEOUS at 12:31

## 2020-05-10 RX ADMIN — Medication 1000 UNIT(S): at 12:31

## 2020-05-10 RX ADMIN — Medication 650 MILLIGRAM(S): at 19:36

## 2020-05-10 RX ADMIN — Medication 500 MILLIGRAM(S): at 12:31

## 2020-05-10 NOTE — DISCHARGE NOTE PROVIDER - CARE PROVIDER_API CALL
[place] care provider,   Phone: (   )    -  Fax: (   )    -  Follow Up Time: Primary,   Dr. Davi Mi [phone]  Phone: (   )    -  Fax: (   )    -  Follow Up Time:

## 2020-05-10 NOTE — PROGRESS NOTE ADULT - ASSESSMENT
43 y/o man with no PMH who presents with worsening SOB, weakness in the setting of sepsis 2/2 COVID-19 pneumonia.    #Sepsis 2/2 COVID-19 pneumonia:  Inflammatory markers elevated, afebrile, clinically stable, on room air   - patient declined enrollment in plasma trial, wanted time to think about remdesivir  - Tylenol PRN fever, pain  - encourage incentive spirometer albuterol   - isolation  - monitor O2 sat closely, supplemental oxygen to target O2 sat of 92-96%  - SC Lovenox or DVT ppx, not currently eligible for AC trial    #CORY:  Improved, likely 2/2 decreased po intake  - encourage po intake  - monitor BMP, urine output    #Hypernatremia:  Likely free water deficit  - encourage free water  - monitor BMP    #Transaminitis:  Trending up, likely reactive in setting of sepsis  - continue to trend  - hep panel in AM  - F/U abd US    Regular diet

## 2020-05-10 NOTE — PROGRESS NOTE ADULT - SUBJECTIVE AND OBJECTIVE BOX
[facility] Division of Hospital Medicine  Lidya Felder DO  Pager: [phone]  Other Times:  [phone]    Patient is a 44y old  Male who presents with a chief complaint of SOB (09 May 2020 11:30)    SUBJECTIVE / OVERNIGHT EVENTS:  Now on room air although patient seen still dyspneic, difficulty getting out of bed from SOB.    ADDITIONAL REVIEW OF SYSTEMS:    MEDICATIONS  (STANDING):  ascorbic acid 500 milliGRAM(s) Oral daily  cholecalciferol 1000 Unit(s) Oral daily  enoxaparin Injectable 40 milliGRAM(s) SubCutaneous daily    MEDICATIONS  (PRN):  acetaminophen   Tablet .. 650 milliGRAM(s) Oral every 6 hours PRN Temp greater or equal to 38.5C (101.3F), Mild Pain (1 - 3), Moderate Pain (4 - 6)  ALBUTerol    90 MICROgram(s) HFA Inhaler 2 Puff(s) Inhalation every 6 hours PRN Shortness of Breath      CAPILLARY BLOOD GLUCOSE        I&O's Summary    09 May 2020 07:01  -  10 May 2020 07:00  --------------------------------------------------------  IN: 540 mL / OUT: 975 mL / NET: -435 mL    10 May 2020 07:01  -  10 May 2020 10:23  --------------------------------------------------------  IN: 150 mL / OUT: 300 mL / NET: -150 mL        PHYSICAL EXAM:  Vital Signs Last 24 Hrs  T(C): 37.3 (09 May 2020 21:54), Max: 37.3 (09 May 2020 21:54)  T(F): 99.2 (09 May 2020 21:54), Max: 99.2 (09 May 2020 21:54)  HR: 84 (09 May 2020 21:54) (73 - 84)  BP: 121/84 (09 May 2020 21:54) (121/84 - 124/72)  BP(mean): --  RR: 20 (09 May 2020 21:54) (20 - 20)  SpO2: 95% (09 May 2020 21:54) (95% - 98%)  CONSTITUTIONAL: NAD, cooperative with exam  EYES: PERRLA; conjunctiva and sclera clear  ENMT: Moist oral mucosa, no pharyngeal injection or exudates; normal dentition  NECK: Supple, no palpable masses; no thyromegaly  RESPIRATORY: Speaking in full sentences, no accessory m. use  PSYCH: A+O to person, place, and time; affect appropriate  NEUROLOGY: CN 2-12 are intact and symmetric; no gross sensory deficits;   SKIN: No rashes; no palpable lesions    LABS:                        12.5   8.22  )-----------( 240      ( 09 May 2020 04:53 )             36.5     05-10    140  |  104  |  15  ----------------------------<  110<H>  4.0   |  21<L>  |  0.99    Ca    9.0      10 May 2020 04:41  Phos  3.0     05-10  Mg     2.3     05-10    TPro  7.3  /  Alb  3.3  /  TBili  0.5  /  DBili  x   /  AST  148<H>  /  ALT  120<H>  /  AlkPhos  67  05-10    PT/INR - ( 10 May 2020 04:41 )   PT: 15.1 SEC;   INR: 1.31          PTT - ( 09 May 2020 04:53 )  PTT:31.0 SEC          Culture - Blood (collected 07 May 2020 18:05)  Source: .Blood Blood-Venous  Preliminary Report (08 May 2020 19:01):    No growth to date.    Culture - Blood (collected 07 May 2020 18:05)  Source: .Blood Blood-Venous  Preliminary Report (08 May 2020 19:01):    No growth to date.        RADIOLOGY & ADDITIONAL TESTS:  Results Reviewed:   Imaging Personally Reviewed:  Electrocardiogram Personally Reviewed:    COORDINATION OF CARE:  Care Discussed with Consultants/Other Providers [Y/N]:  Prior or Outpatient Records Reviewed [Y/N]:

## 2020-05-10 NOTE — DISCHARGE NOTE PROVIDER - HOSPITAL COURSE
45 y/o man with no PMH who presents with worsening SOB, weakness in the setting of sepsis 2/2 COVID-19 pneumonia.        Sepsis 2/2 COVID-19 pneumonia:    - Inflammatory markers elevated, afebrile, clinically stable, on room air     - patient declined enrollment in plasma trial, wanted time to think about remdesivir    - Tylenol PRN fever, pain    - encourage incentive spirometer albuterol     - isolation    - monitor O2 sat closely, supplemental oxygen to target O2 sat of 92-96%    - SC Lovenox or DVT ppx, not currently eligible for AC trial        CORY:    - Improved, likely 2/2 decreased po intake        Hypernatremia:    - Likely free water deficit    - encourage free water    - improved         Transaminitis:    - Trending up, likely reactive in setting of sepsis    - Abd US: negative        Discussed case with  _____________________ on ________________________, patient medically stable for discharge to ___________________. 44 year old male with no PMH who presents with worsening SOB, weakness in the setting of sepsis secondary to COVID-19 pneumonia.        Sepsis secondary COVID-19 pneumonia    Inflammatory markers were elevated, stable, on room air     patient declined enrollment in plasma trial,     Desaturation on ambulation to 85-89% on RA will be discharged on 2L NC             CORY    Improved, likely secondary decreased po intake improved after receiving fluid             Hypernatremia    Likely free water deficit now resolved        Transaminitis    Improving, likely reactive in setting of sepsis    hepatitis panel non-reactive    abdominal  ultrasound  within normal limits         Discussed with attending ready for discharge with 02 44 year old male with no PMH who presents with worsening SOB, weakness in the setting of sepsis secondary to COVID-19 pneumonia.        Sepsis secondary COVID-19 pneumonia    Inflammatory markers were elevated, stable, on room air     patient declined enrollment in plasma trial,     Desaturation on ambulation to 85-89% on RA will be discharged on 2L NC     tylenol for fever as needed             CORY    Improved, likely secondary decreased po intake improved after receiving fluid             Hypernatremia    Likely free water deficit now resolved        Transaminitis    Improving, likely reactive in setting of sepsis    hepatitis panel non-reactive    abdominal  ultrasound  within normal limits         Discussed with attending ready for discharge with 02 44 year old male with no PMH who presents with worsening SOB, weakness in the setting of sepsis secondary to COVID-19 pneumonia.        Sepsis secondary COVID-19 pneumonia    Inflammatory markers were elevated, stable, on room air     patient declined enrollment in plasma trial    Desaturation on ambulation to 85-89% on RA will be discharged on 2L NC     tylenol for fever as needed             CORY    Improved, likely secondary decreased po intake improved after receiving fluid             Hypernatremia    Likely free water deficit now resolved        Transaminitis    Improving, likely reactive in setting of sepsis    hepatitis panel non-reactive    abdominal  ultrasound  within normal limits         Discussed with attending ready for discharge with 02

## 2020-05-10 NOTE — DISCHARGE NOTE PROVIDER - PROVIDER TOKENS
FREE:[LAST:[[facility] care provider],PHONE:[(   )    -],FAX:[(   )    -]] FREE:[LAST:[Primary],PHONE:[(   )    -],FAX:[(   )    -],ADDRESS:[Dr. Davi Mi [phone]]]

## 2020-05-10 NOTE — DISCHARGE NOTE PROVIDER - NSDCFUADDAPPT_GEN_ALL_CORE_FT
Please follow up with your primary care provider within 1 week of discharge from the hospital. If you do not have a primary care provider please follow up with the [facility], call [phone]

## 2020-05-10 NOTE — DISCHARGE NOTE PROVIDER - NSDCMRMEDTOKEN_GEN_ALL_CORE_FT
acetaminophen 325 mg oral tablet: 2 tab(s) orally every 6 hours, As needed, Temp greater or equal to 38.5C (101.3F), Mild Pain (1 - 3), Moderate Pain (4 - 6)

## 2020-05-10 NOTE — DISCHARGE NOTE PROVIDER - NSDCCPCAREPLAN_GEN_ALL_CORE_FT
PRINCIPAL DISCHARGE DIAGNOSIS  Diagnosis: COVID-19  Assessment and Plan of Treatment: You were found to be positive COVID 19 virus. Please follow full instructions listed in your paperwork. Please self quarantine at home for 14 days from discharge and stay 6 feet away minimum from other individuals or animals. Do not go to work, school, public areas, or use public transportaion. Restrict outside activity except for  medical care. Please call ahead if you have an appointment with your doctor to inform them of your COVID positive status. Always wear a facemask at home. Cover your sneezes and coughs, and wash hands with soap and water for at least 20 seconds frequently. Avoid sharing personal household items. Clean all surfaces where you contact frequently such as coutners and doorknobs frequently.     If you have any worsening breathing, faster breathing or trouble with breathing call 911 immediately or your healthcare provider ahead of time and wear facemask before being seen by medical personel.   Take tylenol for your fevers. Please follow state and local rules and regulations regarding coming off of isolation.      SECONDARY DISCHARGE DIAGNOSES  Diagnosis: Transaminitis  Assessment and Plan of Treatment: Your liver function test enzymes were found to be elevated, likely due to COVID 19 infection. Abdominal ultrasound was performed and was negative for acute issues. Please follow up with your primary care provider within 1 week of discharge from the hospital for repeat blood work cbc and cmp. If you do not have a primary care provider please follow up with the [facility], call [phone].    Diagnosis: CORY (acute kidney injury)  Assessment and Plan of Treatment: Your creatinine (kidney marker) was found to be elevated, likely due to poor oral intake. Now improved. Please follow up with your primary care provider within 1 week of discharge from the hospital for repeat blood work cbc and cmp. If you do not have a primary care provider please follow up with the [facility], call [phone].    Diagnosis: Hypernatremia  Assessment and Plan of Treatment: Your sodium level was found to be elevated. Now improved. Please follow up with your primary care provider within 1 week of discharge from the hospital for repeat blood work cbc and cmp. If you do not have a primary care provider please follow up with the [facility], call [phone]. PRINCIPAL DISCHARGE DIAGNOSIS  Diagnosis: COVID-19  Assessment and Plan of Treatment: You were found to be positive COVID 19 virus. Please follow full instructions listed in your paperwork. Please self quarantine at home for 14 days from discharge and stay 6 feet away minimum from other individuals or animals. Do not go to work, school, public areas, or use public transportaion. Restrict outside activity except for  medical care. Please call ahead if you have an appointment with your doctor to inform them of your COVID positive status. Always wear a facemask at home. Cover your sneezes and coughs, and wash hands with soap and water for at least 20 seconds frequently. Avoid sharing personal household items. Clean all surfaces where you contact frequently such as coutners and doorknobs frequently.     If you have any worsening breathing, faster breathing or trouble with breathing call 911 immediately or your healthcare provider ahead of time and wear facemask before being seen by medical personel.   Take tylenol for your fevers. Please follow state and local rules and regulations regarding coming off of isolation.      SECONDARY DISCHARGE DIAGNOSES  Diagnosis: Transaminitis  Assessment and Plan of Treatment: Your liver function test enzymes were found to be elevated, likely due to COVID 19 infection. Abdominal ultrasound was performed and was negative for acute issues. Please follow up with your primary care provider within 1 week of discharge from the hospital for repeat blood work cbc and cmp. If you do not have a primary care provider please follow up with the [facility], call [phone].    Diagnosis: Hypernatremia  Assessment and Plan of Treatment: Your sodium level was found to be elevated. Now improved. Please follow up with your primary care provider within 1 week of discharge from the hospital for repeat blood work cbc and cmp. If you do not have a primary care provider please follow up with the [facility], call [phone].    Diagnosis: CORY (acute kidney injury)  Assessment and Plan of Treatment: Your creatinine (kidney marker) was found to be elevated, likely due to poor oral intake. Now improved. Please follow up with your primary care provider within 1 week of discharge from the hospital for repeat blood work cbc and cmp. If you do not have a primary care provider please follow up with the [facility], call [phone]. PRINCIPAL DISCHARGE DIAGNOSIS  Diagnosis: COVID-19  Assessment and Plan of Treatment: You were found to be positive COVID 19 virus. Please follow full instructions listed in your paperwork. Please self quarantine at home for 14 days from discharge and stay 6 feet away minimum from other individuals or animals. Do not go to work, school, public areas, or use public transportaion. Restrict outside activity except for  medical care. Please call ahead if you have an appointment with your doctor to inform them of your COVID positive status. Always wear a facemask at home. Cover your sneezes and coughs, and wash hands with soap and water for at least 20 seconds frequently. Avoid sharing personal household items. Clean all surfaces where you contact frequently such as coutners and doorknobs frequently.     If you have any worsening breathing, faster breathing or trouble with breathing call 911 immediately or your healthcare provider ahead of time and wear facemask before being seen by medical personel.   Take tylenol for your fevers. Please follow state and local rules and regulations regarding coming off of isolation.      SECONDARY DISCHARGE DIAGNOSES  Diagnosis: Transaminitis  Assessment and Plan of Treatment: Your liver function test enzymes were found to be elevated, likely due to COVID 19 infection. Abdominal ultrasound was performed and was negative for acute issues. Please follow up with your primary care provider within 1 week of discharge from the hospital for repeat blood work cbc and cmp. If you do not have a primary care provider please follow up with the [facility], call [phone].    Diagnosis: Hypernatremia  Assessment and Plan of Treatment: Your sodium level was found to be elevated. Now improved. Please follow up with your primary care provider within 1 week of discharge from the hospital for repeat blood work cbc and cmp. If you do not have a primary care provider please follow up with the [facility], call [phone].    Diagnosis: CORY (acute kidney injury)  Assessment and Plan of Treatment: Your creatinine (kidney marker) was found to be elevated, likely due to poor oral intake. Now improved. Please follow up with your primary care provider within 1 week of discharge from the hospital for repeat blood work cbc and cmp. If you do not have a primary care provider please follow up with the [facility], call [phone].

## 2020-05-11 LAB
ALBUMIN SERPL ELPH-MCNC: 3.2 G/DL — LOW (ref 3.3–5)
ALP SERPL-CCNC: 64 U/L — SIGNIFICANT CHANGE UP (ref 40–120)
ALT FLD-CCNC: 120 U/L — HIGH (ref 4–41)
ANION GAP SERPL CALC-SCNC: 13 MMO/L — SIGNIFICANT CHANGE UP (ref 7–14)
AST SERPL-CCNC: 90 U/L — HIGH (ref 4–40)
BASOPHILS # BLD AUTO: 0.01 K/UL — SIGNIFICANT CHANGE UP (ref 0–0.2)
BASOPHILS NFR BLD AUTO: 0.2 % — SIGNIFICANT CHANGE UP (ref 0–2)
BASOPHILS NFR SPEC: 0 % — SIGNIFICANT CHANGE UP (ref 0–2)
BILIRUB SERPL-MCNC: 0.6 MG/DL — SIGNIFICANT CHANGE UP (ref 0.2–1.2)
BLASTS # FLD: 0 % — SIGNIFICANT CHANGE UP (ref 0–0)
BUN SERPL-MCNC: 9 MG/DL — SIGNIFICANT CHANGE UP (ref 7–23)
CALCIUM SERPL-MCNC: 8.8 MG/DL — SIGNIFICANT CHANGE UP (ref 8.4–10.5)
CHLORIDE SERPL-SCNC: 103 MMOL/L — SIGNIFICANT CHANGE UP (ref 98–107)
CO2 SERPL-SCNC: 24 MMOL/L — SIGNIFICANT CHANGE UP (ref 22–31)
CREAT SERPL-MCNC: 0.92 MG/DL — SIGNIFICANT CHANGE UP (ref 0.5–1.3)
EOSINOPHIL # BLD AUTO: 0.02 K/UL — SIGNIFICANT CHANGE UP (ref 0–0.5)
EOSINOPHIL NFR BLD AUTO: 0.4 % — SIGNIFICANT CHANGE UP (ref 0–6)
EOSINOPHIL NFR FLD: 0.9 % — SIGNIFICANT CHANGE UP (ref 0–6)
GIANT PLATELETS BLD QL SMEAR: PRESENT — SIGNIFICANT CHANGE UP
GLUCOSE SERPL-MCNC: 107 MG/DL — HIGH (ref 70–99)
HCT VFR BLD CALC: 38.6 % — LOW (ref 39–50)
HGB BLD-MCNC: 13.2 G/DL — SIGNIFICANT CHANGE UP (ref 13–17)
IMM GRANULOCYTES NFR BLD AUTO: 0.6 % — SIGNIFICANT CHANGE UP (ref 0–1.5)
LYMPHOCYTES # BLD AUTO: 1.15 K/UL — SIGNIFICANT CHANGE UP (ref 1–3.3)
LYMPHOCYTES # BLD AUTO: 22.4 % — SIGNIFICANT CHANGE UP (ref 13–44)
LYMPHOCYTES NFR SPEC AUTO: 8.8 % — LOW (ref 13–44)
MAGNESIUM SERPL-MCNC: 2 MG/DL — SIGNIFICANT CHANGE UP (ref 1.6–2.6)
MCHC RBC-ENTMCNC: 29.5 PG — SIGNIFICANT CHANGE UP (ref 27–34)
MCHC RBC-ENTMCNC: 34.2 % — SIGNIFICANT CHANGE UP (ref 32–36)
MCV RBC AUTO: 86.4 FL — SIGNIFICANT CHANGE UP (ref 80–100)
METAMYELOCYTES # FLD: 0 % — SIGNIFICANT CHANGE UP (ref 0–1)
MONOCYTES # BLD AUTO: 0.57 K/UL — SIGNIFICANT CHANGE UP (ref 0–0.9)
MONOCYTES NFR BLD AUTO: 11.1 % — SIGNIFICANT CHANGE UP (ref 2–14)
MONOCYTES NFR BLD: 13.1 % — HIGH (ref 2–9)
MYELOCYTES NFR BLD: 0.9 % — HIGH (ref 0–0)
NEUTROPHIL AB SER-ACNC: 71 % — SIGNIFICANT CHANGE UP (ref 43–77)
NEUTROPHILS # BLD AUTO: 3.35 K/UL — SIGNIFICANT CHANGE UP (ref 1.8–7.4)
NEUTROPHILS NFR BLD AUTO: 65.3 % — SIGNIFICANT CHANGE UP (ref 43–77)
NEUTS BAND # BLD: 0.9 % — SIGNIFICANT CHANGE UP (ref 0–6)
NRBC # FLD: 0 K/UL — SIGNIFICANT CHANGE UP (ref 0–0)
OTHER - HEMATOLOGY %: 0 — SIGNIFICANT CHANGE UP
PHOSPHATE SERPL-MCNC: 3.3 MG/DL — SIGNIFICANT CHANGE UP (ref 2.5–4.5)
PLATELET # BLD AUTO: 380 K/UL — SIGNIFICANT CHANGE UP (ref 150–400)
PLATELET COUNT - ESTIMATE: NORMAL — SIGNIFICANT CHANGE UP
PMV BLD: 10.1 FL — SIGNIFICANT CHANGE UP (ref 7–13)
POTASSIUM SERPL-MCNC: 3.7 MMOL/L — SIGNIFICANT CHANGE UP (ref 3.5–5.3)
POTASSIUM SERPL-SCNC: 3.7 MMOL/L — SIGNIFICANT CHANGE UP (ref 3.5–5.3)
PROMYELOCYTES # FLD: 0 % — SIGNIFICANT CHANGE UP (ref 0–0)
PROT SERPL-MCNC: 7.1 G/DL — SIGNIFICANT CHANGE UP (ref 6–8.3)
RBC # BLD: 4.47 M/UL — SIGNIFICANT CHANGE UP (ref 4.2–5.8)
RBC # FLD: 13.7 % — SIGNIFICANT CHANGE UP (ref 10.3–14.5)
REVIEW TO FOLLOW: YES — SIGNIFICANT CHANGE UP
SODIUM SERPL-SCNC: 140 MMOL/L — SIGNIFICANT CHANGE UP (ref 135–145)
VARIANT LYMPHS # BLD: 3.5 % — SIGNIFICANT CHANGE UP
WBC # BLD: 5.13 K/UL — SIGNIFICANT CHANGE UP (ref 3.8–10.5)
WBC # FLD AUTO: 5.13 K/UL — SIGNIFICANT CHANGE UP (ref 3.8–10.5)

## 2020-05-11 PROCEDURE — 99233 SBSQ HOSP IP/OBS HIGH 50: CPT

## 2020-05-11 RX ADMIN — Medication 1000 UNIT(S): at 12:15

## 2020-05-11 RX ADMIN — Medication 500 MILLIGRAM(S): at 12:15

## 2020-05-11 RX ADMIN — ENOXAPARIN SODIUM 40 MILLIGRAM(S): 100 INJECTION SUBCUTANEOUS at 12:15

## 2020-05-11 NOTE — PROGRESS NOTE ADULT - SUBJECTIVE AND OBJECTIVE BOX
Patient is a 44y old  Male who presents with a chief complaint of SOB (10 May 2020 14:39)      SUBJECTIVE / OVERNIGHT EVENTS:    No events overnight. This AM, patient without n/v/d. SOB improving with supplemental oxygen. Able to walk to bathroom and return to bed. No cp.    MEDICATIONS  (STANDING):  ascorbic acid 500 milliGRAM(s) Oral daily  cholecalciferol 1000 Unit(s) Oral daily  enoxaparin Injectable 40 milliGRAM(s) SubCutaneous daily    MEDICATIONS  (PRN):  acetaminophen   Tablet .. 650 milliGRAM(s) Oral every 6 hours PRN Temp greater or equal to 38.5C (101.3F), Mild Pain (1 - 3), Moderate Pain (4 - 6)  ALBUTerol    90 MICROgram(s) HFA Inhaler 2 Puff(s) Inhalation every 6 hours PRN Shortness of Breath      PHYSICAL EXAM:  T(C): 37.7 (05-11-20 @ 10:30), Max: 38.5 (05-10-20 @ 19:28)  HR: 69 (05-11-20 @ 10:30) (69 - 79)  BP: 132/75 (05-11-20 @ 10:30) (132/75 - 132/75)  RR: 19 (05-11-20 @ 10:30) (19 - 19)  SpO2: 96% (05-11-20 @ 12:59) (85% - 99%)  I&O's Summary    10 May 2020 07:01  -  11 May 2020 07:00  --------------------------------------------------------  IN: 510 mL / OUT: 550 mL / NET: -40 mL    11 May 2020 07:01  -  11 May 2020 13:14  --------------------------------------------------------  IN: 0 mL / OUT: 300 mL / NET: -300 mL      GENERAL: NAD, well-developed, lying in bed  HEAD:  Atraumatic, Normocephalic, MMM  CHEST/LUNG: No use of accessory muscles, speaking in complete sentences, using O2 via NC  PSYCH: AAOx3  NEUROLOGY: CN II-XII grossly intact, moving all extremities  SKIN: No rashes or lesions    LABS:  CAPILLARY BLOOD GLUCOSE                              13.2   5.13  )-----------( 380      ( 11 May 2020 06:00 )             38.6     05-11    140  |  103  |  9   ----------------------------<  107<H>  3.7   |  24  |  0.92    Ca    8.8      11 May 2020 06:00  Phos  3.3     05-11  Mg     2.0     05-11    TPro  7.1  /  Alb  3.2<L>  /  TBili  0.6  /  DBili  x   /  AST  90<H>  /  ALT  120<H>  /  AlkPhos  64  05-11    PT/INR - ( 10 May 2020 04:41 )   PT: 15.1 SEC;   INR: 1.31                      RADIOLOGY & ADDITIONAL TESTS:    Telemetry Personally Reviewed -     Imaging Personally Reviewed -     Imaging Reviewed -     Consultant(s) Notes Reviewed -       Care Discussed with Consultants/Other Providers -

## 2020-05-11 NOTE — PROGRESS NOTE ADULT - ASSESSMENT
45 y/o man with no PMH who presents with worsening SOB, weakness in the setting of sepsis 2/2 COVID-19 pneumonia.    #Sepsis 2/2 COVID-19 pneumonia:  Inflammatory markers elevated, afebrile, clinically stable, on room air   - patient declined enrollment in plasma trial, wanted time to think about remdesivir  - Tylenol PRN fever, pain  - encourage incentive spirometer albuterol   - isolation  - monitor O2 sat closely, supplemental oxygen to target O2 sat of 92-96%; will obtain ambulatory pulse ox to see if patient will require home O2  - SC Lovenox or DVT ppx, not currently eligible for AC trial    #CORY:  Improved, likely 2/2 decreased po intake  - encourage po intake  - monitor BMP, urine output    #Hypernatremia:  Likely free water deficit  - encourage free water  - monitor BMP  - resolved    #Transaminitis:  Improving, likely reactive in setting of sepsis  - hep panel non-reactive  - abd US wnl    Regular diet

## 2020-05-12 ENCOUNTER — TRANSCRIPTION ENCOUNTER (OUTPATIENT)
Age: 45
End: 2020-05-12

## 2020-05-12 VITALS
RESPIRATION RATE: 17 BRPM | DIASTOLIC BLOOD PRESSURE: 72 MMHG | TEMPERATURE: 99 F | HEART RATE: 87 BPM | SYSTOLIC BLOOD PRESSURE: 112 MMHG | OXYGEN SATURATION: 93 %

## 2020-05-12 LAB
ANION GAP SERPL CALC-SCNC: 13 MMO/L — SIGNIFICANT CHANGE UP (ref 7–14)
BUN SERPL-MCNC: 9 MG/DL — SIGNIFICANT CHANGE UP (ref 7–23)
CALCIUM SERPL-MCNC: 9 MG/DL — SIGNIFICANT CHANGE UP (ref 8.4–10.5)
CHLORIDE SERPL-SCNC: 103 MMOL/L — SIGNIFICANT CHANGE UP (ref 98–107)
CO2 SERPL-SCNC: 25 MMOL/L — SIGNIFICANT CHANGE UP (ref 22–31)
CREAT SERPL-MCNC: 1.06 MG/DL — SIGNIFICANT CHANGE UP (ref 0.5–1.3)
CRP SERPL HS-MCNC: 150.6 MG/L — SIGNIFICANT CHANGE UP
CULTURE RESULTS: SIGNIFICANT CHANGE UP
CULTURE RESULTS: SIGNIFICANT CHANGE UP
D DIMER BLD IA.RAPID-MCNC: 2160 NG/ML — SIGNIFICANT CHANGE UP
FERRITIN SERPL-MCNC: 760 NG/ML — HIGH (ref 30–400)
GLUCOSE SERPL-MCNC: 111 MG/DL — HIGH (ref 70–99)
HCT VFR BLD CALC: 36.5 % — LOW (ref 39–50)
HGB BLD-MCNC: 12.2 G/DL — LOW (ref 13–17)
MAGNESIUM SERPL-MCNC: 1.9 MG/DL — SIGNIFICANT CHANGE UP (ref 1.6–2.6)
MCHC RBC-ENTMCNC: 28.7 PG — SIGNIFICANT CHANGE UP (ref 27–34)
MCHC RBC-ENTMCNC: 33.4 % — SIGNIFICANT CHANGE UP (ref 32–36)
MCV RBC AUTO: 85.9 FL — SIGNIFICANT CHANGE UP (ref 80–100)
NRBC # FLD: 0 K/UL — SIGNIFICANT CHANGE UP (ref 0–0)
PHOSPHATE SERPL-MCNC: 3 MG/DL — SIGNIFICANT CHANGE UP (ref 2.5–4.5)
PLATELET # BLD AUTO: 379 K/UL — SIGNIFICANT CHANGE UP (ref 150–400)
PMV BLD: 9.9 FL — SIGNIFICANT CHANGE UP (ref 7–13)
POTASSIUM SERPL-MCNC: 4 MMOL/L — SIGNIFICANT CHANGE UP (ref 3.5–5.3)
POTASSIUM SERPL-SCNC: 4 MMOL/L — SIGNIFICANT CHANGE UP (ref 3.5–5.3)
PROCALCITONIN SERPL-MCNC: 0.62 NG/ML — HIGH (ref 0.02–0.1)
RBC # BLD: 4.25 M/UL — SIGNIFICANT CHANGE UP (ref 4.2–5.8)
RBC # FLD: 13.3 % — SIGNIFICANT CHANGE UP (ref 10.3–14.5)
SODIUM SERPL-SCNC: 141 MMOL/L — SIGNIFICANT CHANGE UP (ref 135–145)
SPECIMEN SOURCE: SIGNIFICANT CHANGE UP
SPECIMEN SOURCE: SIGNIFICANT CHANGE UP
WBC # BLD: 6.02 K/UL — SIGNIFICANT CHANGE UP (ref 3.8–10.5)
WBC # FLD AUTO: 6.02 K/UL — SIGNIFICANT CHANGE UP (ref 3.8–10.5)

## 2020-05-12 PROCEDURE — 99239 HOSP IP/OBS DSCHRG MGMT >30: CPT

## 2020-05-12 RX ORDER — ACETAMINOPHEN 500 MG
2 TABLET ORAL
Qty: 0 | Refills: 0 | DISCHARGE
Start: 2020-05-12

## 2020-05-12 RX ADMIN — Medication 500 MILLIGRAM(S): at 12:14

## 2020-05-12 RX ADMIN — Medication 1000 UNIT(S): at 12:15

## 2020-05-12 NOTE — PROGRESS NOTE ADULT - SUBJECTIVE AND OBJECTIVE BOX
Patient is a 44y old  Male who presents with a chief complaint of SOB (11 May 2020 13:14)      SUBJECTIVE / OVERNIGHT EVENTS:    No events overnight. This AM, patient without n/v/d. SOB improving with supplemental oxygen. No cp. Awaiting delivery of home oxygen concentrator.    MEDICATIONS  (STANDING):  ascorbic acid 500 milliGRAM(s) Oral daily  cholecalciferol 1000 Unit(s) Oral daily  enoxaparin Injectable 40 milliGRAM(s) SubCutaneous daily    MEDICATIONS  (PRN):  acetaminophen   Tablet .. 650 milliGRAM(s) Oral every 6 hours PRN Temp greater or equal to 38.5C (101.3F), Mild Pain (1 - 3), Moderate Pain (4 - 6)  ALBUTerol    90 MICROgram(s) HFA Inhaler 2 Puff(s) Inhalation every 6 hours PRN Shortness of Breath      PHYSICAL EXAM:  T(C): 37.1 (05-12-20 @ 09:16), Max: 37.1 (05-12-20 @ 09:16)  HR: 87 (05-12-20 @ 09:16) (87 - 97)  BP: 112/72 (05-12-20 @ 09:16) (112/72 - 126/75)  RR: 17 (05-12-20 @ 09:16) (17 - 19)  SpO2: 93% (05-12-20 @ 09:16) (89% - 98%)  I&O's Summary    11 May 2020 07:01  -  12 May 2020 07:00  --------------------------------------------------------  IN: 0 mL / OUT: 1200 mL / NET: -1200 mL      GENERAL: NAD, well-developed  HEAD:  Atraumatic, Normocephalic, MMM  CHEST/LUNG: No use of accessory muscles, speaking in complete sentences, wearing O2 via NC  PSYCH: AAOx3  NEUROLOGY: CN II-XII grossly intact, moving all extremities  SKIN: No rashes or lesions    LABS:  CAPILLARY BLOOD GLUCOSE                              12.2   6.02  )-----------( 379      ( 12 May 2020 06:00 )             36.5     05-12    141  |  103  |  9   ----------------------------<  111<H>  4.0   |  25  |  1.06    Ca    9.0      12 May 2020 06:00  Phos  3.0     05-12  Mg     1.9     05-12    TPro  7.1  /  Alb  3.2<L>  /  TBili  0.6  /  DBili  x   /  AST  90<H>  /  ALT  120<H>  /  AlkPhos  64  05-11                RADIOLOGY & ADDITIONAL TESTS:    Telemetry Personally Reviewed -     Imaging Personally Reviewed -     Imaging Reviewed -     Consultant(s) Notes Reviewed -       Care Discussed with Consultants/Other Providers -

## 2020-05-12 NOTE — DISCHARGE NOTE NURSING/CASE MANAGEMENT/SOCIAL WORK - PATIENT PORTAL LINK FT
You can access the FollowMyHealth Patient Portal offered by [facility] by registering at the following website: [email or web address]. By joining mention’s FollowMyHealth portal, you will also be able to view your health information using other applications (apps) compatible with our system.

## 2020-05-12 NOTE — DISCHARGE NOTE NURSING/CASE MANAGEMENT/SOCIAL WORK - NSDCFUADDAPPT_GEN_ALL_CORE_FT
Please follow up with your primary care provider within 1 week of discharge from the hospital. If you do not have a primary care provider please follow up with the [facility], call [phone]

## 2020-05-12 NOTE — PROGRESS NOTE ADULT - ASSESSMENT
43 y/o man with no PMH who presents with worsening SOB, weakness in the setting of sepsis 2/2 COVID-19 pneumonia.    #Sepsis 2/2 COVID-19 pneumonia:  Inflammatory markers elevated, afebrile, clinically stable, on room air   - patient declined enrollment in plasma trial  - Tylenol PRN fever, pain  - encourage incentive spirometer albuterol   - isolation  - SC Lovenox or DVT ppx, not currently eligible for AC trial  - pending dc home with home O2    #CORY:  Improved, likely 2/2 decreased po intake  - encourage po intake  - monitor BMP, urine output    #Hypernatremia:  Likely free water deficit  - encourage free water  - monitor BMP  - resolved    #Transaminitis:  Improving, likely reactive in setting of sepsis  - hep panel non-reactive  - abd US wnl    Regular diet

## 2020-05-12 NOTE — DISCHARGE NOTE NURSING/CASE MANAGEMENT/SOCIAL WORK - NSDCDMENAME_GEN_ALL_CORE_FT
[employer] is the name of the vendor that is supplying your home oxygen. When you have been deemed medically cleared to come off the oxygen by your physician you have to call [facility] [phone] to schedule a  of equipment.

## 2020-05-18 ENCOUNTER — TRANSCRIPTION ENCOUNTER (OUTPATIENT)
Age: 45
End: 2020-05-18

## 2022-08-10 NOTE — ED PROVIDER NOTE - PRINCIPAL DIAGNOSIS
August 10, 2022       Patient: Tawanda Monge   YOB: 1991   Date of Visit: 8/10/2022         To Whom It May Concern:    In my medical opinion, I recommend that Tawanda Monge be excused from in office work today and tomorrow (8/11/2022),  but may work from home tomorrow.     If you have any questions or concerns, please don't hesitate to call [phone]          Sincerely,          JOSÉ ANTONIO Beckett.  Electronically Signed     
Pneumonia

## 2024-04-10 ENCOUNTER — APPOINTMENT (OUTPATIENT)
Dept: ORTHOPEDIC SURGERY | Facility: CLINIC | Age: 49
End: 2024-04-10
Payer: COMMERCIAL

## 2024-04-10 VITALS — WEIGHT: 205 LBS | BODY MASS INDEX: 27.77 KG/M2 | HEIGHT: 72 IN

## 2024-04-10 DIAGNOSIS — M79.18 MYALGIA, OTHER SITE: ICD-10-CM

## 2024-04-10 DIAGNOSIS — Z78.9 OTHER SPECIFIED HEALTH STATUS: ICD-10-CM

## 2024-04-10 DIAGNOSIS — M25.511 PAIN IN RIGHT SHOULDER: ICD-10-CM

## 2024-04-10 DIAGNOSIS — M75.41 IMPINGEMENT SYNDROME OF RIGHT SHOULDER: ICD-10-CM

## 2024-04-10 PROCEDURE — 99203 OFFICE O/P NEW LOW 30 MIN: CPT | Mod: 25

## 2024-04-10 PROCEDURE — J3490M: CUSTOM

## 2024-04-10 PROCEDURE — 73010 X-RAY EXAM OF SHOULDER BLADE: CPT | Mod: RT

## 2024-04-10 PROCEDURE — 73030 X-RAY EXAM OF SHOULDER: CPT | Mod: RT

## 2024-04-10 PROCEDURE — 20611 DRAIN/INJ JOINT/BURSA W/US: CPT | Mod: RT

## 2024-04-10 NOTE — PROCEDURE
[FreeTextEntry3] :  Injection Procedure Note:  The risks, benefits, and alternatives to corticosteroid injection were reviewed with the patient.  Risks outlined include but are not limited to infection, sepsis, bleeding, scarring, skin discoloration, temporary increase in pain, syncopal episode, failure to resolve symptoms, symptoms recurrence, allergic reaction, flare reaction, and elevation of blood sugar in diabetics.  Patient understood the risks and asked to proceed with this treatment course.  Patient Identification Name/: Verbal with patient and/or family  Procedure Verification: Procedure confirmed with patient or family/designee Consent for procedure: Verbal Consent Given Relevant documentation completed, reviewed, and signed Clinical indications for procedure confirmed  Time-out with all members of procedure team immediately prior to procedure: Correct patient identified. Agreement on procedure. Correct side and site.  ULTRASOUND GUIDED SHOULDER SUBACROMIAL INJECTION - RIGHT After verbal consent and identification of the correct patient and correct site, the posterior right shoulder was prepped using alcohol swabs and betadine. This was allowed time to air dry. After ethyl choride spray for skin anesthesia, a mixture of 1cc DepoMedrol 40mg/ml, 3cc Lidocaine 1%, and 3cc Bupivacaine 0.5% was injected under ultrasound guidance into the subacromial space from posterior using a sterile 22G needle. Visualization of the needle and placement of the injection was performed without any complications.  Ultrasound was used for visualization, precise injection in area of tear / calcific density, and / or prior failure or difficult injection.  The patient tolerated the procedure well. After-care instructions were provided and included instructions to ice the area and to call if redness, pain, or fever develop.

## 2024-04-10 NOTE — IMAGING
[de-identified] :  RIGHT SHOULDER Inspection: No swelling.  Palpation: Tenderness is noted at the bicipital groove, anterior and lateral.  Range of motion: There is pain with range of motion. , ER 55, @90ER 90, @90IR 30 Strength: There is pain and discomfort with strength testing. Forward Flexion 4/5. Abduction 4/5.  External Rotation 5-/5 and Internal Rotation 5/5  Neurological testings: motor and sensor intact distally. Ligament Stability and Special Tests:  There is positive arc of pain.  Shoulder apprehension: neg Shoulder relocation: neg Obriens test: pos Biceps Active test: neg De Jesus Labral Shear: neg Impingement testing: pos Kate testing: pos Whipple: pos Cross Body Adduction: neg

## 2024-04-10 NOTE — HISTORY OF PRESENT ILLNESS
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 Has tried anti-inflammatory  , activity mod

## 2024-04-10 NOTE — ASSESSMENT
[FreeTextEntry1] : mri right grisel [facility] 4/3/24 - ac deg, no tears, RTC tendinioathy, bursiits  Right X-Ray Examination of the SHOULDER (2 views):  No fractures, subluxations or dislocations. X-Ray Examination of the SCAPULA 1 or 2 views shows: No significant abnormalities.  Acromial spur.   - We discussed their diagnosis and treatment options at length including the risks and benefits of both surgical and non-surgical options. - Due to risks of surgery, we will continue conservative treatment with PT, icing, and anti-inflammatory medications - The patient was provided with a prescription to work on scapular strengthening and rotator cuff strengthening. - The patient was advised to let pain guide the gradual advancement of activities. - We also discussed the possible of a corticosteroid injection in order to help decrease inflammation and pain so that they can perform better therapy. - The risks, benefits, and alternatives to corticosteroid injection were reviewed with the patient and they wished to proceed with this treatment course. - The patient was advised to apply ice (wrapped in a towel or protective covering) to the area daily (20 minutes at a time, 2-4X/day). - Follow up as needed in 6 weeks to re-evaluate progress with therapy